# Patient Record
Sex: FEMALE | Race: WHITE | Employment: OTHER | URBAN - METROPOLITAN AREA
[De-identification: names, ages, dates, MRNs, and addresses within clinical notes are randomized per-mention and may not be internally consistent; named-entity substitution may affect disease eponyms.]

---

## 2017-05-24 ENCOUNTER — HOSPITAL ENCOUNTER (OUTPATIENT)
Dept: BONE DENSITY | Age: 67
Discharge: HOME | End: 2017-05-24
Attending: FAMILY MEDICINE
Payer: MEDICARE

## 2017-05-24 DIAGNOSIS — Z78.0 POSTMENOPAUSAL: ICD-10-CM

## 2017-05-24 PROCEDURE — 77080 DXA BONE DENSITY AXIAL: CPT

## 2017-11-28 ENCOUNTER — HOSPITAL ENCOUNTER (OUTPATIENT)
Dept: ULTRASOUND IMAGING | Age: 67
Discharge: HOME | End: 2017-11-28
Attending: FAMILY MEDICINE
Payer: MEDICARE

## 2017-11-28 DIAGNOSIS — E04.1 THYROID NODULE: ICD-10-CM

## 2017-11-28 DIAGNOSIS — R22.1 NECK MASS: ICD-10-CM

## 2017-11-28 PROCEDURE — 76536 US EXAM OF HEAD AND NECK: CPT

## 2018-02-22 ENCOUNTER — HOSPITAL ENCOUNTER (OUTPATIENT)
Dept: NON INVASIVE DIAGNOSTICS | Age: 68
Discharge: HOME | End: 2018-02-22
Attending: FAMILY MEDICINE
Payer: MEDICARE

## 2018-02-22 DIAGNOSIS — R00.2 PALPITATIONS: ICD-10-CM

## 2018-02-22 PROCEDURE — 93225 XTRNL ECG REC<48 HRS REC: CPT

## 2018-03-30 ENCOUNTER — OFFICE VISIT (OUTPATIENT)
Dept: CARDIOLOGY CLINIC | Age: 68
End: 2018-03-30

## 2018-03-30 VITALS
BODY MASS INDEX: 27.21 KG/M2 | WEIGHT: 135 LBS | HEIGHT: 59 IN | DIASTOLIC BLOOD PRESSURE: 65 MMHG | SYSTOLIC BLOOD PRESSURE: 147 MMHG | HEART RATE: 62 BPM

## 2018-03-30 DIAGNOSIS — E78.5 HYPERLIPIDEMIA, UNSPECIFIED HYPERLIPIDEMIA TYPE: ICD-10-CM

## 2018-03-30 DIAGNOSIS — E11.9 TYPE 2 DIABETES MELLITUS WITHOUT COMPLICATION, WITHOUT LONG-TERM CURRENT USE OF INSULIN (HCC): ICD-10-CM

## 2018-03-30 DIAGNOSIS — R00.2 PALPITATION: Primary | ICD-10-CM

## 2018-03-30 DIAGNOSIS — R07.9 CHEST PAIN, UNSPECIFIED TYPE: ICD-10-CM

## 2018-03-30 RX ORDER — ASPIRIN 81 MG/1
TABLET ORAL DAILY
COMMUNITY

## 2018-03-30 RX ORDER — HYDROCHLOROTHIAZIDE 12.5 MG/1
12.5 TABLET ORAL DAILY
COMMUNITY

## 2018-03-30 RX ORDER — BISMUTH SUBSALICYLATE 262 MG
1 TABLET,CHEWABLE ORAL DAILY
COMMUNITY

## 2018-03-30 RX ORDER — GLUCOSAM/CHONDRO/HERB 149/HYAL 750-100 MG
1 TABLET ORAL DAILY
COMMUNITY

## 2018-03-30 RX ORDER — LANOLIN ALCOHOL/MO/W.PET/CERES
1000 CREAM (GRAM) TOPICAL DAILY
COMMUNITY
End: 2022-05-19

## 2018-03-30 RX ORDER — CHROMIUM PICOLINATE 200 MCG
TABLET ORAL
COMMUNITY
End: 2019-08-02

## 2018-03-30 RX ORDER — LISINOPRIL 40 MG/1
40 TABLET ORAL DAILY
COMMUNITY

## 2018-03-30 RX ORDER — CYCLOSPORINE 0.5 MG/ML
1 EMULSION OPHTHALMIC 2 TIMES DAILY
COMMUNITY
End: 2022-05-19

## 2018-03-30 NOTE — MR AVS SNAPSHOT
[address] [facility] 
 
 
 [address] [facility] 
[phone] Patient: Wilson Butler MRN: [id number] PSP:2/2/9002 Visit Information Date & Time Provider Department Dept. Phone Encounter #  
 3/30/2018  8:30 AM Meghann Farr MD [facility] [place] [phone] [id number] Follow-up Instructions Return for F/u after tests. Upcoming Health Maintenance Date Due Hepatitis C Screening 1950 DTaP/Tdap/Td series (1 - Tdap) 1/4/1972 BREAST CANCER SCRN MAMMOGRAM 1/4/2001 FOBT Q 1 YEAR AGE 50-75 1/4/2001 ZOSTER VACCINE AGE 60> 11/4/2010 GLAUCOMA SCREENING Q2Y 1/4/2016 Pneumococcal 65+ Low/Medium Risk (1 of 2 - PCV13) 1/4/2016 Influenza Age 5 to Adult 8/1/2017 MEDICARE YEARLY EXAM 3/20/2018 Allergies as of 3/30/2018  Review Complete On: 3/30/2018 By: Meghann Farr MD  
 No Known Allergies Current Immunizations  Never Reviewed No immunizations on file. Not reviewed this visit You Were Diagnosed With   
  
 Codes Comments Palpitation    -  Primary ICD-10-CM: R00.2 ICD-9-CM: 785.1 frequent pac Chest pain, unspecified type     ICD-10-CM: R07.9 ICD-9-CM: 786.50 ? atypical 
? angina-chest wall-gerd Type 2 diabetes mellitus without complication, without long-term current use of insulin (HCC)     ICD-10-CM: E11.9 ICD-9-CM: 250.00 stable Hyperlipidemia, unspecified hyperlipidemia type     ICD-10-CM: E78.5 ICD-9-CM: 272.4 on statin Vitals BP Pulse Height(growth percentile) Weight(growth percentile) BMI Smoking Status 147/65 62 4' 11\" (1.499 m) 135 lb (61.2 kg) 27.27 kg/m2 Never Smoker Vitals History BMI and BSA Data Body Mass Index Body Surface Area  
 27.27 kg/m 2 1.6 m 2 Your Updated Medication List  
  
   
This list is accurate as of 3/30/18  8:56 AM.  Always use your most recent med list.  
  
  
  
  
 aspirin delayed-release 81 mg tablet Take  by mouth daily. CALCIUM 600 WITH VITAMIN D3 600 mg(1,500mg) -400 unit Cap Generic drug:  Calcium-Cholecalciferol (D3) Take  by mouth. CRESTOR 20 mg tablet Generic drug:  rosuvastatin Take 20 mg by mouth daily. cyanocobalamin 1,000 mcg tablet Take 1,000 mcg by mouth daily. hydroCHLOROthiazide 12.5 mg tablet Commonly known as:  HYDRODIURIL Take 12.5 mg by mouth daily. lisinopril 40 mg tablet Commonly known as:  Tami Best Take 40 mg by mouth daily. metFORMIN  mg tablet Commonly known as:  GLUCOPHAGE XR Take  by mouth two (2) times a day. multivitamin tablet Commonly known as:  ONE A DAY Take 1 Tab by mouth daily. omega 3-DHA-EPA-fish oil 1,000 mg (120 mg-180 mg) capsule Take 1 Cap by mouth daily. PROBIOTIC PO Take  by mouth. RESTASIS 0.05 % ophthalmic emulsion Generic drug:  cycloSPORINE Administer 1 Drop to both eyes two (2) times a day. TOPROL XL 50 mg XL tablet Generic drug:  metoprolol succinate Take  by mouth daily. Follow-up Instructions Return for F/u after tests. To-Do List   
 04/02/2018 Cardiac Services:  2D ECHO COMPLETE ADULT (TTE) 04/06/2018 Nursing:  SCHEDULE NUCLEAR STUDY Patient Instructions Requested EKG from PCP [facility]! Dear Rhiannon Aguero: Thank you for requesting a [employer] account. Our records indicate that you already have an active [employer] account. You can access your account anytime at [email or web address] [email or web address] Did you know that you can access your hospital and ER discharge instructions at any time in [employer]? You can also review all of your test results from your hospital stay or ER visit. Additional Information If you have questions, please visit the Frequently Asked Questions section of the [employer] website at [email or web address] Remember, MyChart is NOT to be used for urgent needs. For medical emergencies, dial 911. Now available from your iPhone and Android! Please provide this summary of care documentation to your next provider. Your primary care clinician is listed as Bebeto Healy. If you have any questions after today's visit, please call [phone].

## 2018-03-30 NOTE — LETTER
Racheal Nieto 
1950 
 
3/30/2018 Dear MD Richelle Dejesus MD Yates Rayas, MD 
 
I had the pleasure of evaluating  Ms. Nieto in office today. Below are the relevant portions of my assessment and plan of care. ICD-10-CM ICD-9-CM 1. Palpitation R00.2 785.1 SCHEDULE NUCLEAR STUDY  
   2D ECHO COMPLETE ADULT (TTE) frequent pac 2. Chest pain, unspecified type R07.9 786.50 SCHEDULE NUCLEAR STUDY  
   2D ECHO COMPLETE ADULT (TTE) ? atypical 
? angina-chest wall-gerd 3. Type 2 diabetes mellitus without complication, without long-term current use of insulin (HCC) E11.9 250.00   
 stable 4. Hyperlipidemia, unspecified hyperlipidemia type E78.5 272.4   
 on statin Current Outpatient Prescriptions Medication Sig Dispense Refill  lisinopril (PRINIVIL, ZESTRIL) 40 mg tablet Take 40 mg by mouth daily.  hydroCHLOROthiazide (HYDRODIURIL) 12.5 mg tablet Take 12.5 mg by mouth daily.  cycloSPORINE (RESTASIS) 0.05 % ophthalmic emulsion Administer 1 Drop to both eyes two (2) times a day.  multivitamin (ONE A DAY) tablet Take 1 Tab by mouth daily.  aspirin delayed-release 81 mg tablet Take  by mouth daily.  LACTOBACILLUS ACIDOPHILUS (PROBIOTIC PO) Take  by mouth.  cyanocobalamin 1,000 mcg tablet Take 1,000 mcg by mouth daily.  Calcium-Cholecalciferol, D3, (CALCIUM 600 WITH VITAMIN D3) 600 mg(1,500mg) -400 unit cap Take  by mouth.  omega 3-DHA-EPA-fish oil 1,000 mg (120 mg-180 mg) capsule Take 1 Cap by mouth daily.  metoprolol-XL (TOPROL XL) 50 mg XL tablet Take  by mouth daily.  rosuvastatin (CRESTOR) 20 mg tablet Take 20 mg by mouth daily.  metFORMIN ER (GLUCOPHAGE XR) 500 mg tablet Take  by mouth two (2) times a day. Orders Placed This Encounter  SCHEDULE NUCLEAR STUDY Exercise stress test  
  Standing Status:   Future Standing Expiration Date:   3/30/2019  2D ECHO COMPLETE ADULT (TTE) Standing Status:   Future Standing Expiration Date:   9/26/2018 Order Specific Question:   Reason for Exam: Answer:   see diagnosis  lisinopril (PRINIVIL, ZESTRIL) 40 mg tablet Sig: Take 40 mg by mouth daily.  hydroCHLOROthiazide (HYDRODIURIL) 12.5 mg tablet Sig: Take 12.5 mg by mouth daily.  cycloSPORINE (RESTASIS) 0.05 % ophthalmic emulsion Sig: Administer 1 Drop to both eyes two (2) times a day.  multivitamin (ONE A DAY) tablet Sig: Take 1 Tab by mouth daily.  aspirin delayed-release 81 mg tablet Sig: Take  by mouth daily.  LACTOBACILLUS ACIDOPHILUS (PROBIOTIC PO) Sig: Take  by mouth.  cyanocobalamin 1,000 mcg tablet Sig: Take 1,000 mcg by mouth daily.  Calcium-Cholecalciferol, D3, (CALCIUM 600 WITH VITAMIN D3) 600 mg(1,500mg) -400 unit cap Sig: Take  by mouth.  omega 3-DHA-EPA-fish oil 1,000 mg (120 mg-180 mg) capsule Sig: Take 1 Cap by mouth daily. If you have questions, please do not hesitate to call me. I look forward to following Ms. Nieto along with you. Sincerely, Benigno Dolan MD

## 2018-03-30 NOTE — PROGRESS NOTES
HISTORY OF PRESENT ILLNESS  Anika Nieto is a 80 y.o. female. New Patient   The history is provided by the patient. Associated symptoms include chest pain. Pertinent negatives include no abdominal pain, no headaches and no shortness of breath. Palpitations    The history is provided by the patient. This is a new problem. The problem has been gradually improving. The problem occurs every several days. The problem is associated with nothing. Associated symptoms include chest pain. Pertinent negatives include no fever, no claudication, no near-syncope, no orthopnea, no PND, no abdominal pain, no nausea, no vomiting, no headaches, no dizziness, no weakness, no cough, no hemoptysis, no shortness of breath and no sputum production. Chest Pain    The history is provided by the patient. This is a new problem. The problem has been resolved. The problem occurs rarely. The pain is associated with rest. The pain is present in the substernal region. The pain is mild. The quality of the pain is described as pressure-like. The pain does not radiate. Associated symptoms include palpitations. Pertinent negatives include no abdominal pain, no claudication, no cough, no dizziness, no fever, no headaches, no hemoptysis, no nausea, no near-syncope, no orthopnea, no PND, no shortness of breath, no sputum production, no vomiting and no weakness. Review of Systems   Constitutional: Negative for chills and fever. HENT: Negative for nosebleeds. Eyes: Negative for blurred vision and double vision. Respiratory: Negative for cough, hemoptysis, sputum production, shortness of breath and wheezing. Cardiovascular: Positive for chest pain and palpitations. Negative for orthopnea, claudication, leg swelling, PND and near-syncope. Gastrointestinal: Negative for abdominal pain, heartburn, nausea and vomiting. Musculoskeletal: Negative for myalgias. Skin: Negative for rash.    Neurological: Negative for dizziness, weakness and headaches. Endo/Heme/Allergies: Does not bruise/bleed easily. Family History   Problem Relation Age of Onset    Heart Attack Mother     Stroke Mother     Heart Attack Maternal Aunt     Stroke Maternal Aunt     Heart Attack Maternal Uncle     Stroke Maternal Uncle     Heart Attack Maternal Grandmother     Stroke Maternal Grandmother        Past Medical History:   Diagnosis Date    Chest pain 3/30/2018    ? atypical ? angina-chest wall-gerd     Diabetes mellitus     Essential hypertension     Hyperlipidemia 3/30/2018    on statin    Palpitation 3/30/2018    frequent pac     Type 2 diabetes mellitus without complication, without long-term current use of insulin ([facility] [address] 3/30/2018    stable       Past Surgical History:   Procedure Laterality Date    HX HEENT  1950's    eye surgery on both eyes  crossed eye    HX HEENT      eye lid surgery       Social History   Substance Use Topics    Smoking status: Never Smoker    Smokeless tobacco: Never Used    Alcohol use No       No Known Allergies    Prior to Admission medications    Medication Sig Start Date End Date Taking? Authorizing Provider   lisinopril (PRINIVIL, ZESTRIL) 40 mg tablet Take 40 mg by mouth daily. Yes Historical Provider   hydroCHLOROthiazide (HYDRODIURIL) 12.5 mg tablet Take 12.5 mg by mouth daily. Yes Historical Provider   cycloSPORINE (RESTASIS) 0.05 % ophthalmic emulsion Administer 1 Drop to both eyes two (2) times a day. Yes Historical Provider   multivitamin (ONE A DAY) tablet Take 1 Tab by mouth daily. Yes Historical Provider   aspirin delayed-release 81 mg tablet Take  by mouth daily. Yes Historical Provider   LACTOBACILLUS ACIDOPHILUS (PROBIOTIC PO) Take  by mouth. Yes Historical Provider   cyanocobalamin 1,000 mcg tablet Take 1,000 mcg by mouth daily. Yes Historical Provider   Calcium-Cholecalciferol, D3, (CALCIUM 600 WITH VITAMIN D3) 600 mg(1,500mg) -400 unit cap Take  by mouth.    Yes Historical Provider omega 3-DHA-EPA-fish oil 1,000 mg (120 mg-180 mg) capsule Take 1 Cap by mouth daily. Yes Historical Provider   metoprolol-XL (TOPROL XL) 50 mg XL tablet Take  by mouth daily. Yes Historical Provider   rosuvastatin (CRESTOR) 20 mg tablet Take 20 mg by mouth daily. Yes Historical Provider   metFORMIN ER (GLUCOPHAGE XR) 500 mg tablet Take  by mouth two (2) times a day. Yes Historical Provider         Visit Vitals    /65    Pulse 62    Ht 4' 11\" (1.499 m)    Wt 61.2 kg (135 lb)    BMI 27.27 kg/m2         Physical Exam   Constitutional: She is oriented to person, place, and time. She appears well-developed and well-nourished. HENT:   Head: Normocephalic and atraumatic. Eyes: Conjunctivae are normal.   Neck: Neck supple. No JVD present. No tracheal deviation present. No thyromegaly present. Cardiovascular: Normal rate and regular rhythm. PMI is not displaced. Exam reveals no gallop, no S3 and no decreased pulses. No murmur heard. Pulmonary/Chest: No respiratory distress. She has no wheezes. She has no rales. She exhibits no tenderness. Abdominal: Soft. There is no tenderness. Musculoskeletal: She exhibits no edema. Neurological: She is alert and oriented to person, place, and time. Skin: Skin is warm. Psychiatric: She has a normal mood and affect. Ms. Nieto has a reminder for a \"due or due soon\" health maintenance. I have asked that she contact her primary care provider for follow-up on this health maintenance. I Have personally reviewed recent relevant labs available and discussed with patient  tsh-lipids    holter-2/2018  Rhythm is sinus. 2. CO and QRS are within normal limits. 3. 54 single VEs. 4. 4497 single SVEs, 40 paired, 17 runs of SVT (maximum 5 beats in duration). 5. Patient diary was submitted with 2 episodes of palpitations. Assessment         ICD-10-CM ICD-9-CM    1.  Palpitation R00.2 785.1 SCHEDULE NUCLEAR STUDY      2D ECHO COMPLETE ADULT (TTE)    frequent pac     2. Chest pain, unspecified type R07.9 786.50 SCHEDULE NUCLEAR STUDY      2D ECHO COMPLETE ADULT (TTE)    ? atypical  ? angina-chest wall-gerd     3. Type 2 diabetes mellitus without complication, without long-term current use of insulin (HCC) E11.9 250.00     stable   4. Hyperlipidemia, unspecified hyperlipidemia type E78.5 272.4     on statin       Medications Discontinued During This Encounter   Medication Reason    fenofibric acid (TRILIPIX) 135 mg capsule Not A Current Medication    latanoprost (XALATAN) 0.005 % ophthalmic solution Not A Current Medication    lisinopril-hydrochlorothiazide (PRINZIDE, ZESTORETIC) 20-25 mg per tablet Not A Current Medication       Orders Placed This Encounter    SCHEDULE NUCLEAR STUDY     Exercise stress test     Standing Status:   Future     Standing Expiration Date:   3/30/2019    2D ECHO COMPLETE ADULT (TTE)     Standing Status:   Future     Standing Expiration Date:   9/26/2018     Order Specific Question:   Reason for Exam:     Answer:   see diagnosis       Follow-up Disposition:  Return for F/u after tests.

## 2018-04-09 ENCOUNTER — CLINICAL SUPPORT (OUTPATIENT)
Dept: CARDIOLOGY CLINIC | Age: 68
End: 2018-04-09

## 2018-04-09 DIAGNOSIS — R00.2 PALPITATION: ICD-10-CM

## 2018-04-09 DIAGNOSIS — R07.9 CHEST PAIN, UNSPECIFIED TYPE: ICD-10-CM

## 2018-04-09 DIAGNOSIS — R07.9 CHEST PAIN, UNSPECIFIED TYPE: Primary | ICD-10-CM

## 2018-04-09 DIAGNOSIS — E78.5 HYPERLIPIDEMIA, UNSPECIFIED HYPERLIPIDEMIA TYPE: ICD-10-CM

## 2018-04-09 LAB — LDL-C, EXTERNAL: 28

## 2018-04-09 NOTE — PROGRESS NOTES
[facility]  [address], [address] [place] [address], [place], [address] [place] [address]  [phone] [place] [phone]      Name: Maegan Nieto         MRN#: [id number]      YOB: 1950     Gender: female Ht:4'11 \" Wt:135 lbs            Date of Rest/Stress Images: 4/9/2018   Referring Provider: Cherry Grewal MD  Ordering Provider: Nicci Rodrigez. Sara John MD, [facility]  Technologist: Burnard Holstein. Sandor JUNG, C.N.M.T. Diagnosis:   1. Chest pain, unspecified type    2. Palpitation    3. Hyperlipidemia, unspecified hyperlipidemia type          Rest/Stress Myoview SPECT Myocardial Perfusion Imaging with  Exercise Stress and Gated SPECT Imaging      PROCEDURE:      Myocardial perfusion imaging was performed at rest approximately 30 mins following the intravenous injection,(Right hand ) of 12.2 mCi of Tc99m Myoview for evaluation of myocardial function and perfusion at rest.     Baseline Data:  Baseline EKG reveals normal sinus rhythm with occasional PVCs. Baseline heart rate was 54 beats per minute. Baseline blood pressure was 170/86 mmHg. Procedure: Following this, the patient exercised on the treadmill using standard [place] protocol. The patient walked for a total of 8 minutes and 7 seconds, achieved a work load of 10.10 METS. Maximum heart rate was 126 beats per minute, which represents 82% of the maximal age-predicted heart rate. Maximum blood pressure was 190/102 mmHg. The patient did not report chest pain or trouble breathing. No ventricular arrhythmias were noted. No ischemic ST-T changes were noted. The test was stopped due to completion of protocol. Diagnosis:  1. Negative EKG portion for ischemia. 2. Nuclear imaging report to follow. Thank you for this referral.             Exercise:   At peak exercise, the patient was injected intravenously with 34.9 mCi of Tc99m Myoview and exercise was continued for 15 to 45 seconds. The stress images were obtained approximately 30 minutes post tracer injection. The stress SPECT study was gated to evaluate regional wall motion and calculate the left ventricular ejection fraction. The data was reconstructed in the short, horizontal long and vertical long axis views and tomographic slices were generated. NUCLEAR IMAGING:     Findings:   1. Stress images reveal normal Myoview distrubution in all the LV segments in short axis, vertical and horizontal long axis views. 2. Resting images have a normal uptake. 3. Gated images reveal normal wall motion and the ejection fraction is calculated to be 78%. Conclusion:   1. Normal perfusion scan. 2. Normal wall motion and ejection fraction. 3. Low risk scan.     Thank you for the referral.    E-signed and Interpreting Physician:    Heather Severino MD, [facility]     Date of interpretation: 4/9/2018  Date of final report: 4/9/2018

## 2018-04-17 ENCOUNTER — OFFICE VISIT (OUTPATIENT)
Dept: CARDIOLOGY CLINIC | Age: 68
End: 2018-04-17

## 2018-04-17 VITALS
SYSTOLIC BLOOD PRESSURE: 125 MMHG | DIASTOLIC BLOOD PRESSURE: 54 MMHG | HEART RATE: 55 BPM | HEIGHT: 59 IN | BODY MASS INDEX: 27.62 KG/M2 | WEIGHT: 137 LBS

## 2018-04-17 DIAGNOSIS — I34.0 NON-RHEUMATIC MITRAL REGURGITATION: ICD-10-CM

## 2018-04-17 DIAGNOSIS — R00.2 PALPITATION: ICD-10-CM

## 2018-04-17 DIAGNOSIS — R07.9 CHEST PAIN, UNSPECIFIED TYPE: Primary | ICD-10-CM

## 2018-04-17 RX ORDER — DOXAZOSIN 2 MG/1
2 TABLET ORAL DAILY
COMMUNITY
End: 2020-11-02

## 2018-04-17 NOTE — PROGRESS NOTES
1. Have you been to the ER, urgent care clinic since your last visit? Hospitalized since your last visit?     no  2. Have you seen or consulted any other health care providers outside of the [address] [place] [address] since your last visit? Include any pap smears or colon screening. Yes Where: pcp     3. Since your last visit, have you had any of the following symptoms?  no

## 2018-04-17 NOTE — MR AVS SNAPSHOT
303 [facility] 
 
 
 [address] 200 [facility] 
[phone] Patient: Karlene Lewis MRN: [id number] EOL:9/3/0233 Visit Information Date & Time Provider Department Dept. Phone Encounter #  
 4/17/2018  8:30 AM Anamaria Ortiz MD [facility] [place] [phone] [id number] Follow-up Instructions Return in about 1 year (around 4/17/2019). Upcoming Health Maintenance Date Due Hepatitis C Screening 1950 HEMOGLOBIN A1C Q6M 1950 LIPID PANEL Q1 1950 FOOT EXAM Q1 1/4/1961 MICROALBUMIN Q1 1/4/1961 EYE EXAM RETINAL OR DILATED Q1 1/4/1961 DTaP/Tdap/Td series (1 - Tdap) 1/4/1972 BREAST CANCER SCRN MAMMOGRAM 1/4/2001 FOBT Q 1 YEAR AGE 50-75 1/4/2001 ZOSTER VACCINE AGE 60> 11/4/2010 GLAUCOMA SCREENING Q2Y 1/4/2016 Pneumococcal 65+ Low/Medium Risk (1 of 2 - PCV13) 1/4/2016 Influenza Age 5 to Adult 8/1/2017 MEDICARE YEARLY EXAM 3/20/2018 Allergies as of 4/17/2018  Review Complete On: 4/17/2018 By: Anamaria Ortiz MD  
 No Known Allergies Current Immunizations  Never Reviewed No immunizations on file. Not reviewed this visit You Were Diagnosed With   
  
 Codes Comments Chest pain, unspecified type    -  Primary ICD-10-CM: R07.9 ICD-9-CM: 786.50 negative stres stest  
 Palpitation     ICD-10-CM: R00.2 ICD-9-CM: 785.1 pac 
improving off caffein monitor Non-rheumatic mitral regurgitation     ICD-10-CM: I34.0 ICD-9-CM: 424.0 mild asymptomatic 
monitor Vitals BP Pulse Height(growth percentile) Weight(growth percentile) BMI Smoking Status 125/54 (!) 55 4' 11\" (1.499 m) 137 lb (62.1 kg) 27.67 kg/m2 Never Smoker Vitals History BMI and BSA Data Body Mass Index Body Surface Area  
 27.67 kg/m 2 1.61 m 2 Your Updated Medication List  
  
   
This list is accurate as of 4/17/18  8:47 AM.  Always use your most recent med list.  
  
  
  
  
 aspirin delayed-release 81 mg tablet Take  by mouth daily. CALCIUM 600 WITH VITAMIN D3 600 mg(1,500mg) -400 unit Cap Generic drug:  Calcium-Cholecalciferol (D3) Take  by mouth. CRESTOR 20 mg tablet Generic drug:  rosuvastatin Take 20 mg by mouth daily. cyanocobalamin 1,000 mcg tablet Take 1,000 mcg by mouth daily. doxazosin 2 mg tablet Commonly known as:  CARDURA Take 2 mg by mouth daily. hydroCHLOROthiazide 12.5 mg tablet Commonly known as:  HYDRODIURIL Take 12.5 mg by mouth daily. lisinopril 40 mg tablet Commonly known as:  Fanny [place] Take 40 mg by mouth daily. metFORMIN  mg tablet Commonly known as:  GLUCOPHAGE XR Take  by mouth two (2) times a day. multivitamin tablet Commonly known as:  ONE A DAY Take 1 Tab by mouth daily. omega 3-DHA-EPA-fish oil 1,000 mg (120 mg-180 mg) capsule Take 1 Cap by mouth daily. PROBIOTIC PO Take  by mouth. RESTASIS 0.05 % ophthalmic emulsion Generic drug:  cycloSPORINE Administer 1 Drop to both eyes two (2) times a day. TOPROL XL 50 mg XL tablet Generic drug:  metoprolol succinate Take  by mouth daily. Follow-up Instructions Return in about 1 year (around 4/17/2019). Introducing [facility]! Dear Nadine Pimentel: Thank you for requesting a [employer] account. Our records indicate that you already have an active [employer] account. You can access your account anytime at [email or web address]/[email or web address] Did you know that you can access your hospital and ER discharge instructions at any time in [employer]? You can also review all of your test results from your hospital stay or ER visit. Additional Information If you have questions, please visit the Frequently Asked Questions section of the [employer] website at [email or web address]/. Remember, [employer] is NOT to be used for urgent needs.  For medical emergencies, dial 911. Now available from your iPhone and Android! Please provide this summary of care documentation to your next provider. Your primary care clinician is listed as Emma Jolly. If you have any questions after today's visit, please call [phone].

## 2018-04-17 NOTE — LETTER
Zee Nieto 
1950 4/17/2018 Dear MD Leora Nayak MD Brenna Giovanni, MD 
 
I had the pleasure of evaluating  Ms. Nieto in office today. Below are the relevant portions of my assessment and plan of care. ICD-10-CM ICD-9-CM 1. Chest pain, unspecified type R07.9 786.50   
 negative stres stest  
2. Palpitation R00.2 785.1 pac 
improving off caffein monitor 3. Non-rheumatic mitral regurgitation I34.0 424.0   
 mild asymptomatic 
monitor Current Outpatient Prescriptions Medication Sig Dispense Refill  doxazosin (CARDURA) 2 mg tablet Take 2 mg by mouth daily.  lisinopril (PRINIVIL, ZESTRIL) 40 mg tablet Take 40 mg by mouth daily.  hydroCHLOROthiazide (HYDRODIURIL) 12.5 mg tablet Take 12.5 mg by mouth daily.  cycloSPORINE (RESTASIS) 0.05 % ophthalmic emulsion Administer 1 Drop to both eyes two (2) times a day.  multivitamin (ONE A DAY) tablet Take 1 Tab by mouth daily.  aspirin delayed-release 81 mg tablet Take  by mouth daily.  LACTOBACILLUS ACIDOPHILUS (PROBIOTIC PO) Take  by mouth.  cyanocobalamin 1,000 mcg tablet Take 1,000 mcg by mouth daily.  Calcium-Cholecalciferol, D3, (CALCIUM 600 WITH VITAMIN D3) 600 mg(1,500mg) -400 unit cap Take  by mouth.  omega 3-DHA-EPA-fish oil 1,000 mg (120 mg-180 mg) capsule Take 1 Cap by mouth daily.  metoprolol-XL (TOPROL XL) 50 mg XL tablet Take  by mouth daily.  rosuvastatin (CRESTOR) 20 mg tablet Take 20 mg by mouth daily.  metFORMIN ER (GLUCOPHAGE XR) 500 mg tablet Take  by mouth two (2) times a day. Orders Placed This Encounter  doxazosin (CARDURA) 2 mg tablet Sig: Take 2 mg by mouth daily. If you have questions, please do not hesitate to call me. I look forward to following Ms. Nieto along with you. Sincerely, Mike Holley MD

## 2018-04-17 NOTE — PROGRESS NOTES
HISTORY OF PRESENT ILLNESS  Asuncion Nieto is a 80 y.o. female. Hypertension   The history is provided by the patient. This is a chronic problem. The problem occurs constantly. Pertinent negatives include no chest pain, no abdominal pain, no headaches and no shortness of breath. Palpitations    The history is provided by the patient. This is a new problem. The problem has been gradually improving. The problem occurs every several days. The problem is associated with nothing. Pertinent negatives include no fever, no chest pain, no claudication, no near-syncope, no orthopnea, no PND, no abdominal pain, no nausea, no vomiting, no headaches, no dizziness, no weakness, no cough, no hemoptysis, no shortness of breath and no sputum production. Her past medical history is significant for hypertension. Chest Pain (Angina)    The history is provided by the patient. This is a new problem. The problem has been resolved. The problem occurs rarely. The pain is associated with rest. The pain is present in the substernal region. The pain is mild. The quality of the pain is described as pressure-like. The pain does not radiate. Associated symptoms include palpitations. Pertinent negatives include no abdominal pain, no claudication, no cough, no dizziness, no fever, no headaches, no hemoptysis, no nausea, no near-syncope, no orthopnea, no PND, no shortness of breath, no sputum production, no vomiting and no weakness. Review of Systems   Constitutional: Negative for chills and fever. HENT: Negative for nosebleeds. Eyes: Negative for blurred vision and double vision. Respiratory: Negative for cough, hemoptysis, sputum production, shortness of breath and wheezing. Cardiovascular: Positive for palpitations. Negative for chest pain, orthopnea, claudication, leg swelling, PND and near-syncope. Gastrointestinal: Negative for abdominal pain, heartburn, nausea and vomiting. Musculoskeletal: Negative for myalgias. Skin: Negative for rash. Neurological: Negative for dizziness, weakness and headaches. Endo/Heme/Allergies: Does not bruise/bleed easily. Family History   Problem Relation Age of Onset    Heart Attack Mother     Stroke Mother     Heart Attack Maternal Aunt     Stroke Maternal Aunt     Heart Attack Maternal Uncle     Stroke Maternal Uncle     Heart Attack Maternal Grandmother     Stroke Maternal Grandmother        Past Medical History:   Diagnosis Date    Chest pain 3/30/2018    ? atypical ? angina-chest wall-gerd     Diabetes mellitus     Essential hypertension     Hyperlipidemia 3/30/2018    on statin    Palpitation 3/30/2018    frequent pac     Type 2 diabetes mellitus without complication, without long-term current use of insulin ([facility] [address] 3/30/2018    stable       Past Surgical History:   Procedure Laterality Date    HX HEENT  1950's    eye surgery on both eyes  crossed eye    HX HEENT      eye lid surgery       Social History   Substance Use Topics    Smoking status: Never Smoker    Smokeless tobacco: Never Used    Alcohol use No       No Known Allergies    Prior to Admission medications    Medication Sig Start Date End Date Taking? Authorizing Provider   doxazosin (CARDURA) 2 mg tablet Take 2 mg by mouth daily. Yes Historical Provider   lisinopril (PRINIVIL, ZESTRIL) 40 mg tablet Take 40 mg by mouth daily. Yes Historical Provider   hydroCHLOROthiazide (HYDRODIURIL) 12.5 mg tablet Take 12.5 mg by mouth daily. Yes Historical Provider   cycloSPORINE (RESTASIS) 0.05 % ophthalmic emulsion Administer 1 Drop to both eyes two (2) times a day. Yes Historical Provider   multivitamin (ONE A DAY) tablet Take 1 Tab by mouth daily. Yes Historical Provider   aspirin delayed-release 81 mg tablet Take  by mouth daily. Yes Historical Provider   LACTOBACILLUS ACIDOPHILUS (PROBIOTIC PO) Take  by mouth. Yes Historical Provider   cyanocobalamin 1,000 mcg tablet Take 1,000 mcg by mouth daily. Yes Historical Provider   Calcium-Cholecalciferol, D3, (CALCIUM 600 WITH VITAMIN D3) 600 mg(1,500mg) -400 unit cap Take  by mouth. Yes Historical Provider   omega 3-DHA-EPA-fish oil 1,000 mg (120 mg-180 mg) capsule Take 1 Cap by mouth daily. Yes Historical Provider   metoprolol-XL (TOPROL XL) 50 mg XL tablet Take  by mouth daily. Yes Historical Provider   rosuvastatin (CRESTOR) 20 mg tablet Take 20 mg by mouth daily. Yes Historical Provider   metFORMIN ER (GLUCOPHAGE XR) 500 mg tablet Take  by mouth two (2) times a day. Yes Historical Provider         Visit Vitals    /54    Pulse (!) 55    Ht 4' 11\" (1.499 m)    Wt 62.1 kg (137 lb)    BMI 27.67 kg/m2         Physical Exam   Constitutional: She is oriented to person, place, and time. She appears well-developed and well-nourished. HENT:   Head: Normocephalic and atraumatic. Eyes: Conjunctivae are normal.   Neck: Neck supple. No JVD present. No tracheal deviation present. No thyromegaly present. Cardiovascular: Normal rate and regular rhythm. PMI is not displaced. Exam reveals no gallop, no S3 and no decreased pulses. No murmur heard. Pulmonary/Chest: No respiratory distress. She has no wheezes. She has no rales. She exhibits no tenderness. Abdominal: Soft. There is no tenderness. Musculoskeletal: She exhibits no edema. Neurological: She is alert and oriented to person, place, and time. Skin: Skin is warm. Psychiatric: She has a normal mood and affect. Ms. Nieto has a reminder for a \"due or due soon\" health maintenance. I have asked that she contact her primary care provider for follow-up on this health maintenance. I Have personally reviewed recent relevant labs available and discussed with patient  tsh-lipids    holter-2/2018  Rhythm is sinus. 2. [place] and QRS are within normal limits. 3. 54 single VEs. 4. 4497 single SVEs, 40 paired, 17 runs of SVT (maximum 5 beats in duration).   5. Patient diary was submitted with 2 episodes of palpitations. SUMMARY:stress test-2018  Left ventricle: Systolic function was normal. Ejection fraction was  estimated in the range of 55 % to 60 %. There were no regional wall motion  abnormalities. Mitral valve: There was mild regurgitation. Tricuspid valve: There was mild regurgitation. NUCLEAR IMAGIN      Findings:   1. Stress images reveal normal Myoview distrubution in all the LV segments in short axis, vertical and horizontal long axis views. 2. Resting images have a normal uptake. 3. Gated images reveal normal wall motion and the ejection fraction is calculated to be 78%. Conclusion:   1. Normal perfusion scan. 2. Normal wall motion and ejection fraction. 3. Low risk scan. Assessment         ICD-10-CM ICD-9-CM    1. Chest pain, unspecified type R07.9 786.50     negative stres stest   2. Palpitation R00.2 785.1     pac  improving off caffein monitor   3. Non-rheumatic mitral regurgitation I34.0 424.0     mild asymptomatic  monitor       There are no discontinued medications. No orders of the defined types were placed in this encounter. Follow-up Disposition:  Return in about 1 year (around 2019).

## 2018-09-10 ENCOUNTER — HOSPITAL ENCOUNTER (OUTPATIENT)
Dept: MAMMOGRAPHY | Age: 68
Discharge: HOME | End: 2018-09-10
Attending: FAMILY MEDICINE
Payer: MEDICARE

## 2018-09-10 DIAGNOSIS — Z12.31 VISIT FOR SCREENING MAMMOGRAM: ICD-10-CM

## 2018-09-10 PROCEDURE — 77063 BREAST TOMOSYNTHESIS BI: CPT

## 2019-05-14 LAB — LDL-C, EXTERNAL: 28

## 2019-05-20 ENCOUNTER — OFFICE VISIT (OUTPATIENT)
Dept: CARDIOLOGY CLINIC | Age: 69
End: 2019-05-20

## 2019-05-20 VITALS
SYSTOLIC BLOOD PRESSURE: 128 MMHG | HEART RATE: 55 BPM | BODY MASS INDEX: 28.02 KG/M2 | WEIGHT: 139 LBS | HEIGHT: 59 IN | DIASTOLIC BLOOD PRESSURE: 55 MMHG

## 2019-05-20 DIAGNOSIS — R00.2 PALPITATIONS: ICD-10-CM

## 2019-05-20 DIAGNOSIS — E78.5 HYPERLIPIDEMIA, UNSPECIFIED HYPERLIPIDEMIA TYPE: ICD-10-CM

## 2019-05-20 DIAGNOSIS — I34.0 NON-RHEUMATIC MITRAL REGURGITATION: Primary | ICD-10-CM

## 2019-05-20 DIAGNOSIS — R42 DIZZINESS: ICD-10-CM

## 2019-05-20 NOTE — PROGRESS NOTES
1. Have you been to the ER, urgent care clinic since your last visit? Hospitalized since your last visit? No     2. Have you seen or consulted any other health care providers outside of the [address] [place] [address] since your last visit? Include any pap smears or colon screening. Yes Where: PCP/Podiatrist     3. Since your last visit, have you had any of the following symptoms? .           4. Have you had any blood work, X-rays or cardiac testing? 5. Where do you normally have your labs drawn? 6. Do you need any refills today?

## 2019-05-20 NOTE — PROGRESS NOTES
HISTORY OF PRESENT ILLNESS  Yuli Nieto is a 77 y.o. female. 5/2019  Occasional episode of palpitation. Also feels lightheaded with near passing out when she is working in garden in the daytime. Happens on and off for past few years. No complete syncope. Hypertension   The history is provided by the patient. This is a chronic problem. The problem occurs constantly. Palpitations    The history is provided by the patient. This is a new problem. The problem has been gradually improving. The problem occurs every several days. The problem is associated with nothing. Her past medical history is significant for hypertension. Review of Systems   Constitutional: Negative for chills. HENT: Negative for nosebleeds. Eyes: Negative for blurred vision and double vision. Respiratory: Negative for wheezing. Cardiovascular: Negative for leg swelling. Gastrointestinal: Negative for heartburn. Musculoskeletal: Negative for myalgias. Skin: Negative for rash. Endo/Heme/Allergies: Does not bruise/bleed easily. Family History   Problem Relation Age of Onset    Heart Attack Mother     Stroke Mother     Heart Attack Maternal Aunt     Stroke Maternal Aunt     Heart Attack Maternal Uncle     Stroke Maternal Uncle     Heart Attack Maternal Grandmother     Stroke Maternal Grandmother        Past Medical History:   Diagnosis Date    Chest pain 3/30/2018    ?  atypical ? angina-chest wall-gerd     Diabetes mellitus     Essential hypertension     Hyperlipidemia 3/30/2018    on statin    Palpitation 3/30/2018    frequent pac     Type 2 diabetes mellitus without complication, without long-term current use of insulin ([address] 3/30/2018    stable       Past Surgical History:   Procedure Laterality Date    HX HEENT  1950's    eye surgery on both eyes  crossed eye    HX HEENT      eye lid surgery       Social History     Tobacco Use    Smoking status: Never Smoker    Smokeless tobacco: Never Used Substance Use Topics    Alcohol use: No       No Known Allergies    Prior to Admission medications    Medication Sig Start Date End Date Taking? Authorizing Provider   doxazosin (CARDURA) 2 mg tablet Take 2 mg by mouth daily. Yes Provider, Historical   lisinopril (PRINIVIL, ZESTRIL) 40 mg tablet Take 40 mg by mouth daily. Yes Provider, Historical   hydroCHLOROthiazide (HYDRODIURIL) 12.5 mg tablet Take 12.5 mg by mouth daily. Yes Provider, Historical   cycloSPORINE (RESTASIS) 0.05 % ophthalmic emulsion Administer 1 Drop to both eyes two (2) times a day. Yes Provider, Historical   multivitamin (ONE A DAY) tablet Take 1 Tab by mouth daily. Yes Provider, Historical   aspirin delayed-release 81 mg tablet Take  by mouth daily. Yes Provider, Historical   LACTOBACILLUS ACIDOPHILUS (PROBIOTIC PO) Take  by mouth. Yes Provider, Historical   cyanocobalamin 1,000 mcg tablet Take 1,000 mcg by mouth daily. Yes Provider, Historical   Calcium-Cholecalciferol, D3, (CALCIUM 600 WITH VITAMIN D3) 600 mg(1,500mg) -400 unit cap Take  by mouth. Yes Provider, Historical   omega 3-DHA-EPA-fish oil 1,000 mg (120 mg-180 mg) capsule Take 1 Cap by mouth daily. Yes Provider, Historical   metoprolol-XL (TOPROL XL) 50 mg XL tablet Take  by mouth daily. Yes Provider, Historical   rosuvastatin (CRESTOR) 20 mg tablet Take 20 mg by mouth daily. Yes Provider, Historical   metFORMIN ER (GLUCOPHAGE XR) 500 mg tablet Take  by mouth two (2) times a day. Yes Provider, Historical         Visit Vitals  /55   Pulse (!) 55   Ht 4' 11\" (1.499 m)   Wt 63 kg (139 lb)   BMI 28.07 kg/m²         Physical Exam   Constitutional: She is oriented to person, place, and time. She appears well-developed and well-nourished. HENT:   Head: Normocephalic and atraumatic. Eyes: Conjunctivae are normal.   Neck: Neck supple. No JVD present. No tracheal deviation present. No thyromegaly present.    Cardiovascular: Normal rate and regular rhythm. PMI is not displaced. Exam reveals no gallop, no S3 and no decreased pulses. No murmur heard. Pulmonary/Chest: No respiratory distress. She has no wheezes. She has no rales. She exhibits no tenderness. Abdominal: Soft. There is no tenderness. Musculoskeletal: She exhibits no edema. Neurological: She is alert and oriented to person, place, and time. Skin: Skin is warm. Psychiatric: She has a normal mood and affect. Ms. Nieto has a reminder for a \"due or due soon\" health maintenance. I have asked that she contact her primary care provider for follow-up on this health maintenance. I Have personally reviewed recent relevant labs available and discussed with patient  tsh-lipids    holter-2018  Rhythm is sinus. 2. [place] and QRS are within normal limits. 3. 54 single VEs. 4. 4497 single SVEs, 40 paired, 17 runs of SVT (maximum 5 beats in duration). 5. Patient diary was submitted with 2 episodes of palpitations. SUMMARY:stress test-2018  Left ventricle: Systolic function was normal. Ejection fraction was  estimated in the range of 55 % to 60 %. There were no regional wall motion  abnormalities. Mitral valve: There was mild regurgitation. Tricuspid valve: There was mild regurgitation. NUCLEAR IMAGIN      Findings:   1. Stress images reveal normal Myoview distrubution in all the LV segments in short axis, vertical and horizontal long axis views. 2. Resting images have a normal uptake. 3. Gated images reveal normal wall motion and the ejection fraction is calculated to be 78%. Conclusion:   1. Normal perfusion scan. 2. Normal wall motion and ejection fraction. 3. Low risk scan. Assessment         ICD-10-CM ICD-9-CM    1. Non-rheumatic mitral regurgitation I34.0 424.0     Stable mild asymptomatic   2. Hyperlipidemia, unspecified hyperlipidemia type E78.5 272.4     Continue treatment we will get labs from PCP   3. Palpitations R00.2 785.1     Occasional episode. Continue current treatment with beta-blocker   4. Dizziness R42 780.4     Associated with exertion in the gardening. Possible orthostatic or vagal.  Continue to monitor. If recurrent consider tilt table and echo   5/2019  Episodes of dizziness on exertion. Could be orthostatic or vagal.  Last echo shows no pulmonary hypertension. If recurrent consider tilt table testing. Will change timing of lisinopril to evening and metoprolol to morning    There are no discontinued medications. No orders of the defined types were placed in this encounter. Follow-up and Dispositions    · Return in about 6 months (around 11/20/2019).

## 2019-06-03 ENCOUNTER — HOSPITAL ENCOUNTER (OUTPATIENT)
Dept: ULTRASOUND IMAGING | Age: 69
Discharge: HOME | End: 2019-06-03
Attending: FAMILY MEDICINE
Payer: MEDICARE

## 2019-06-03 DIAGNOSIS — R31.29 MICROSCOPIC HEMATURIA: ICD-10-CM

## 2019-06-03 PROCEDURE — 76770 US EXAM ABDO BACK WALL COMP: CPT

## 2019-11-12 ENCOUNTER — OFFICE VISIT (OUTPATIENT)
Dept: CARDIOLOGY CLINIC | Age: 69
End: 2019-11-12

## 2019-11-12 VITALS
WEIGHT: 136.4 LBS | DIASTOLIC BLOOD PRESSURE: 61 MMHG | HEIGHT: 59 IN | BODY MASS INDEX: 27.5 KG/M2 | SYSTOLIC BLOOD PRESSURE: 140 MMHG | HEART RATE: 52 BPM

## 2019-11-12 DIAGNOSIS — I34.0 NON-RHEUMATIC MITRAL REGURGITATION: Primary | ICD-10-CM

## 2019-11-12 DIAGNOSIS — E78.5 HYPERLIPIDEMIA, UNSPECIFIED HYPERLIPIDEMIA TYPE: ICD-10-CM

## 2019-11-12 DIAGNOSIS — R42 DIZZINESS: ICD-10-CM

## 2019-11-12 NOTE — PROGRESS NOTES
1. Have you been to the ER, urgent care clinic since your last visit? Hospitalized since your last visit? No    2. Have you seen or consulted any other health care providers outside of the [address] [place] [address] since your last visit? Include any pap smears or colon screening.  Yes Where: GI

## 2019-11-12 NOTE — PROGRESS NOTES
HISTORY OF PRESENT ILLNESS  Shawnee Nieto is a 77 y.o. female. 5/2019  Occasional episode of palpitation. Also feels lightheaded with near passing out when she is working in garden in the daytime. Happens on and off for past few years. No complete syncope. Hypertension   The history is provided by the patient. This is a chronic problem. The problem occurs constantly. Palpitations    The history is provided by the patient. This is a new problem. The problem has been gradually improving. The problem occurs every several days. The problem is associated with nothing. Her past medical history is significant for hypertension. Review of Systems   Constitutional: Negative for chills. HENT: Negative for nosebleeds. Eyes: Negative for blurred vision and double vision. Respiratory: Negative for wheezing. Cardiovascular: Positive for palpitations. Negative for leg swelling. Gastrointestinal: Negative for heartburn. Musculoskeletal: Negative for myalgias. Skin: Negative for rash. Endo/Heme/Allergies: Does not bruise/bleed easily. Family History   Problem Relation Age of Onset    Heart Attack Mother     Stroke Mother     Heart Attack Maternal Aunt     Stroke Maternal Aunt     Heart Attack Maternal Uncle     Stroke Maternal Uncle     Heart Attack Maternal Grandmother     Stroke Maternal Grandmother        Past Medical History:   Diagnosis Date    Chest pain 3/30/2018    ?  atypical ? angina-chest wall-gerd     Diabetes mellitus     Essential hypertension     Hyperlipidemia 3/30/2018    on statin    Palpitation 3/30/2018    frequent pac     Type 2 diabetes mellitus without complication, without long-term current use of insulin ([facility] [address] 3/30/2018    stable       Past Surgical History:   Procedure Laterality Date    HX HEENT  1950's    eye surgery on both eyes  crossed eye    HX HEENT      eye lid surgery       Social History     Tobacco Use    Smoking status: Never Smoker    Smokeless tobacco: Never Used   Substance Use Topics    Alcohol use: No       No Known Allergies    Prior to Admission medications    Medication Sig Start Date End Date Taking? Authorizing Provider   doxazosin (CARDURA) 2 mg tablet Take 2 mg by mouth daily. Yes Provider, Historical   lisinopril (PRINIVIL, ZESTRIL) 40 mg tablet Take 40 mg by mouth daily. Yes Provider, Historical   hydroCHLOROthiazide (HYDRODIURIL) 12.5 mg tablet Take 12.5 mg by mouth daily. Yes Provider, Historical   cycloSPORINE (RESTASIS) 0.05 % ophthalmic emulsion Administer 1 Drop to both eyes two (2) times a day. Yes Provider, Historical   multivitamin (ONE A DAY) tablet Take 1 Tab by mouth daily. Yes Provider, Historical   aspirin delayed-release 81 mg tablet Take  by mouth daily. Yes Provider, Historical   cyanocobalamin 1,000 mcg tablet Take 1,000 mcg by mouth daily. Yes Provider, Historical   omega 3-DHA-EPA-fish oil 1,000 mg (120 mg-180 mg) capsule Take 1 Cap by mouth daily. Yes Provider, Historical   metoprolol-XL (TOPROL XL) 50 mg XL tablet Take  by mouth daily. Yes Provider, Historical   rosuvastatin (CRESTOR) 20 mg tablet Take 20 mg by mouth daily. Yes Provider, Historical   metFORMIN ER (GLUCOPHAGE XR) 500 mg tablet Take  by mouth two (2) times a day. Yes Provider, Historical         Visit Vitals  /61   Pulse (!) 52   Ht 4' 11\" (1.499 m)   Wt 61.9 kg (136 lb 6.4 oz)   BMI 27.55 kg/m²         Physical Exam   Constitutional: She is oriented to person, place, and time. She appears well-developed and well-nourished. HENT:   Head: Normocephalic and atraumatic. Eyes: Conjunctivae are normal.   Neck: Neck supple. No JVD present. No tracheal deviation present. No thyromegaly present. Cardiovascular: Normal rate and regular rhythm. PMI is not displaced. Exam reveals no gallop, no S3 and no decreased pulses. No murmur heard. Pulmonary/Chest: No respiratory distress. She has no wheezes. She has no rales. She exhibits no tenderness. Abdominal: Soft. There is no tenderness. Musculoskeletal: She exhibits no edema. Neurological: She is alert and oriented to person, place, and time. Skin: Skin is warm. Psychiatric: She has a normal mood and affect. Ms. Nieto has a reminder for a \"due or due soon\" health maintenance. I have asked that she contact her primary care provider for follow-up on this health maintenance. I Have personally reviewed recent relevant labs available and discussed with patient  tsh-lipids    holter-2018  Rhythm is sinus. 2. LA and QRS are within normal limits. 3. 54 single VEs. 4. 4497 single SVEs, 40 paired, 17 runs of SVT (maximum 5 beats in duration). 5. Patient diary was submitted with 2 episodes of palpitations. SUMMARY:stress test-2018  Left ventricle: Systolic function was normal. Ejection fraction was  estimated in the range of 55 % to 60 %. There were no regional wall motion  abnormalities. Mitral valve: There was mild regurgitation. Tricuspid valve: There was mild regurgitation. NUCLEAR IMAGIN      Findings:   1. Stress images reveal normal Myoview distrubution in all the LV segments in short axis, vertical and horizontal long axis views. 2. Resting images have a normal uptake. 3. Gated images reveal normal wall motion and the ejection fraction is calculated to be 78%. Conclusion:   1. Normal perfusion scan. 2. Normal wall motion and ejection fraction. 3. Low risk scan. Assessment         ICD-10-CM ICD-9-CM    1. Non-rheumatic mitral regurgitation I34.0 424.0     Stable asymptomatic monitor   2. Hyperlipidemia, unspecified hyperlipidemia type E78.5 272.4     Continue treatment lab with PCP   3. Dizziness R42 780.4     Stable monitor   2019  Episodes of dizziness on exertion. Could be orthostatic or vagal.  Last echo shows no pulmonary hypertension. If recurrent consider tilt table testing.   Will change timing of lisinopril to evening and metoprolol to morning    Medications Discontinued During This Encounter   Medication Reason    LACTOBACILLUS ACIDOPHILUS (PROBIOTIC PO) Therapy Completed       No orders of the defined types were placed in this encounter. Follow-up and Dispositions    · Return in about 6 months (around 5/12/2020).

## 2019-11-18 ENCOUNTER — HOSPITAL ENCOUNTER (OUTPATIENT)
Dept: MAMMOGRAPHY | Age: 69
Discharge: HOME | End: 2019-11-18
Attending: FAMILY MEDICINE
Payer: MEDICARE

## 2019-11-18 DIAGNOSIS — Z12.31 BREAST CANCER SCREENING BY MAMMOGRAM: ICD-10-CM

## 2019-11-18 PROCEDURE — 77063 BREAST TOMOSYNTHESIS BI: CPT

## 2020-09-15 ENCOUNTER — OFFICE VISIT (OUTPATIENT)
Dept: CARDIOLOGY CLINIC | Age: 70
End: 2020-09-15

## 2020-09-15 VITALS
HEIGHT: 59 IN | BODY MASS INDEX: 28.06 KG/M2 | WEIGHT: 139.2 LBS | DIASTOLIC BLOOD PRESSURE: 66 MMHG | TEMPERATURE: 97.2 F | SYSTOLIC BLOOD PRESSURE: 155 MMHG | HEART RATE: 61 BPM

## 2020-09-15 DIAGNOSIS — E78.5 HYPERLIPIDEMIA, UNSPECIFIED HYPERLIPIDEMIA TYPE: ICD-10-CM

## 2020-09-15 DIAGNOSIS — R42 DIZZINESS: ICD-10-CM

## 2020-09-15 DIAGNOSIS — I47.1 SVT (SUPRAVENTRICULAR TACHYCARDIA) (HCC): ICD-10-CM

## 2020-09-15 DIAGNOSIS — R00.2 PALPITATIONS: ICD-10-CM

## 2020-09-15 DIAGNOSIS — I34.0 NON-RHEUMATIC MITRAL REGURGITATION: Primary | ICD-10-CM

## 2020-09-15 NOTE — PROGRESS NOTES
HISTORY OF PRESENT ILLNESS  Veronica Nieto is a 72 y.o. female. 5/2019  Occasional episode of palpitation. Also feels lightheaded with near passing out when she is working in garden in the daytime. Happens on and off for past few years. No complete syncope. Hypertension   The history is provided by the patient. This is a chronic problem. The problem occurs constantly. Palpitations    The history is provided by the patient. This is a new problem. The problem has been gradually improving. The problem occurs every several days. The problem is associated with nothing. Her past medical history is significant for hypertension. Review of Systems   Constitutional: Negative for chills. HENT: Negative for nosebleeds. Eyes: Negative for blurred vision and double vision. Respiratory: Negative for wheezing. Cardiovascular: Positive for palpitations. Negative for leg swelling. Gastrointestinal: Negative for heartburn. Musculoskeletal: Negative for myalgias. Skin: Negative for rash. Endo/Heme/Allergies: Does not bruise/bleed easily. Family History   Problem Relation Age of Onset    Heart Attack Mother     Stroke Mother     Heart Attack Maternal Aunt     Stroke Maternal Aunt     Heart Attack Maternal Uncle     Stroke Maternal Uncle     Heart Attack Maternal Grandmother     Stroke Maternal Grandmother        Past Medical History:   Diagnosis Date    Chest pain 3/30/2018    ?  atypical ? angina-chest wall-gerd     Diabetes mellitus     Essential hypertension     Hyperlipidemia 3/30/2018    on statin    Palpitation 3/30/2018    frequent pac     Type 2 diabetes mellitus without complication, without long-term current use of insulin ([facility] [address] 3/30/2018    stable       Past Surgical History:   Procedure Laterality Date    HX HEENT  1950's    eye surgery on both eyes  crossed eye    HX HEENT      eye lid surgery       Social History     Tobacco Use    Smoking status: Never Smoker    Smokeless tobacco: Never Used   Substance Use Topics    Alcohol use: No       No Known Allergies    Prior to Admission medications    Medication Sig Start Date End Date Taking? Authorizing Provider   doxazosin (CARDURA) 2 mg tablet Take 2 mg by mouth daily. Yes Provider, Historical   lisinopril (PRINIVIL, ZESTRIL) 40 mg tablet Take 40 mg by mouth daily. Yes Provider, Historical   hydroCHLOROthiazide (HYDRODIURIL) 12.5 mg tablet Take 12.5 mg by mouth daily. Yes Provider, Historical   cycloSPORINE (RESTASIS) 0.05 % ophthalmic emulsion Administer 1 Drop to both eyes two (2) times a day. Yes Provider, Historical   multivitamin (ONE A DAY) tablet Take 1 Tab by mouth daily. Yes Provider, Historical   aspirin delayed-release 81 mg tablet Take  by mouth daily. Yes Provider, Historical   cyanocobalamin 1,000 mcg tablet Take 1,000 mcg by mouth daily. Yes Provider, Historical   omega 3-DHA-EPA-fish oil 1,000 mg (120 mg-180 mg) capsule Take 1 Cap by mouth daily. Yes Provider, Historical   metoprolol succinate (Toprol XL) 25 mg XL tablet Take 25 mg by mouth daily. Yes Provider, Historical   rosuvastatin (CRESTOR) 20 mg tablet Take 20 mg by mouth daily. Yes Provider, Historical   metFORMIN ER (GLUCOPHAGE XR) 500 mg tablet Take  by mouth two (2) times a day. Yes Provider, Historical         Visit Vitals  BP (!) 155/66   Pulse 61   Temp 97.2 °F (36.2 °C)   Ht 4' 11\" (1.499 m)   Wt 63.1 kg (139 lb 3.2 oz)   BMI 28.11 kg/m²         Physical Exam   Constitutional: She is oriented to person, place, and time. She appears well-developed and well-nourished. HENT:   Head: Normocephalic and atraumatic. Eyes: Conjunctivae are normal.   Neck: Neck supple. No JVD present. No tracheal deviation present. No thyromegaly present. Cardiovascular: Normal rate and regular rhythm. PMI is not displaced. Exam reveals no gallop, no S3 and no decreased pulses. No murmur heard. Pulmonary/Chest: No respiratory distress.  She has no wheezes. She has no rales. She exhibits no tenderness. Abdominal: Soft. There is no abdominal tenderness. Musculoskeletal:         General: No edema. Neurological: She is alert and oriented to person, place, and time. Skin: Skin is warm. Psychiatric: She has a normal mood and affect. Ms. Nieto has a reminder for a \"due or due soon\" health maintenance. I have asked that she contact her primary care provider for follow-up on this health maintenance. I Have personally reviewed recent relevant labs available and discussed with patient  tsh-lipids    holter-2018  Rhythm is sinus. 2. ME and QRS are within normal limits. 3. 54 single VEs. 4. 4497 single SVEs, 40 paired, 17 runs of SVT (maximum 5 beats in duration). 5. Patient diary was submitted with 2 episodes of palpitations. SUMMARY:stress test-2018  Left ventricle: Systolic function was normal. Ejection fraction was  estimated in the range of 55 % to 60 %. There were no regional wall motion  abnormalities. Mitral valve: There was mild regurgitation. Tricuspid valve: There was mild regurgitation. NUCLEAR IMAGIN      Findings:   1. Stress images reveal normal Myoview distrubution in all the LV segments in short axis, vertical and horizontal long axis views. 2. Resting images have a normal uptake. 3. Gated images reveal normal wall motion and the ejection fraction is calculated to be 78%. Conclusion:   1. Normal perfusion scan. 2. Normal wall motion and ejection fraction. 3. Low risk scan. Assessment         ICD-10-CM ICD-9-CM    1. Non-rheumatic mitral regurgitation  I34.0 424.0     Stable asymptomatic monitor   2. Hyperlipidemia, unspecified hyperlipidemia type  E78.5 272.4     Continue treatment lab with PCP   3. Dizziness  R42 780.4     Stable. Continue therapy monitor   4. Palpitations  R00.2 785.1 CARDIAC EVENT MONITOR    Recently increasing palpitation.   History of SVT in the past.  Recently metoprolol had to be reduced due to resting bradycardia. 5. SVT (supraventricular tachycardia) ([facility], [facility])  I47.1 427.89 CARDIAC EVENT MONITOR    Short SVT in past recent increase symptom   5/2019  Episodes of dizziness on exertion. Could be orthostatic or vagal.  Last echo shows no pulmonary hypertension. If recurrent consider tilt table testing. Will change timing of lisinopril to evening and metoprolol to morning  9/2020  Symptom of palpitation and SVT. Check event monitor metoprolol was reduced recently due to resting bradycardia. There are no discontinued medications. No orders of the defined types were placed in this encounter. Follow-up and Dispositions    · Return in about 6 weeks (around 10/27/2020).

## 2020-09-15 NOTE — PROGRESS NOTES
1. Have you been to the ER, urgent care clinic since your last visit? Hospitalized since your last visit?     no    2. Have you seen or consulted any other health care providers outside of the [address] [place] [address] since your last visit? Include any pap smears or colon screening. Yes Where: pcp     3. Since your last visit, have you had any of the following symptoms?      palpitations.

## 2020-11-02 ENCOUNTER — OFFICE VISIT (OUTPATIENT)
Dept: CARDIOLOGY CLINIC | Age: 70
End: 2020-11-02
Payer: MEDICARE

## 2020-11-02 VITALS
HEIGHT: 59 IN | SYSTOLIC BLOOD PRESSURE: 152 MMHG | DIASTOLIC BLOOD PRESSURE: 56 MMHG | WEIGHT: 133 LBS | BODY MASS INDEX: 26.81 KG/M2 | HEART RATE: 57 BPM | TEMPERATURE: 97 F

## 2020-11-02 DIAGNOSIS — R00.2 PALPITATIONS: Primary | ICD-10-CM

## 2020-11-02 DIAGNOSIS — E78.5 HYPERLIPIDEMIA, UNSPECIFIED HYPERLIPIDEMIA TYPE: ICD-10-CM

## 2020-11-02 DIAGNOSIS — I47.1 SVT (SUPRAVENTRICULAR TACHYCARDIA) (HCC): ICD-10-CM

## 2020-11-02 DIAGNOSIS — R00.2 PALPITATIONS: ICD-10-CM

## 2020-11-02 DIAGNOSIS — I34.0 NON-RHEUMATIC MITRAL REGURGITATION: ICD-10-CM

## 2020-11-02 PROCEDURE — G8427 DOCREV CUR MEDS BY ELIG CLIN: HCPCS | Performed by: INTERNAL MEDICINE

## 2020-11-02 PROCEDURE — 1101F PT FALLS ASSESS-DOCD LE1/YR: CPT | Performed by: INTERNAL MEDICINE

## 2020-11-02 PROCEDURE — G8536 NO DOC ELDER MAL SCRN: HCPCS | Performed by: INTERNAL MEDICINE

## 2020-11-02 PROCEDURE — G8432 DEP SCR NOT DOC, RNG: HCPCS | Performed by: INTERNAL MEDICINE

## 2020-11-02 PROCEDURE — G8399 PT W/DXA RESULTS DOCUMENT: HCPCS | Performed by: INTERNAL MEDICINE

## 2020-11-02 PROCEDURE — 1090F PRES/ABSN URINE INCON ASSESS: CPT | Performed by: INTERNAL MEDICINE

## 2020-11-02 PROCEDURE — G9899 SCRN MAM PERF RSLTS DOC: HCPCS | Performed by: INTERNAL MEDICINE

## 2020-11-02 PROCEDURE — G8419 CALC BMI OUT NRM PARAM NOF/U: HCPCS | Performed by: INTERNAL MEDICINE

## 2020-11-02 PROCEDURE — 99213 OFFICE O/P EST LOW 20 MIN: CPT | Performed by: INTERNAL MEDICINE

## 2020-11-02 PROCEDURE — 3017F COLORECTAL CA SCREEN DOC REV: CPT | Performed by: INTERNAL MEDICINE

## 2020-11-02 NOTE — PROGRESS NOTES
1. Have you been to the ER, urgent care clinic since your last visit? Hospitalized since your last visit?     no  2. Have you seen or consulted any other health care providers outside of the [address] [place] [address] since your last visit? Include any pap smears or colon screening.       Yes Where: pcp

## 2020-11-02 NOTE — PROGRESS NOTES
HISTORY OF PRESENT ILLNESS  Thomas Nieto is a 72 y.o. female. 5/2019  Occasional episode of palpitation. Also feels lightheaded with near passing out when she is working in garden in the daytime. Happens on and off for past few years. No complete syncope. Hypertension   The history is provided by the patient. This is a chronic problem. The problem occurs constantly. Palpitations    The history is provided by the patient. This is a new problem. The problem has been gradually improving. The problem occurs every several days. The problem is associated with nothing. Her past medical history is significant for hypertension. SVT         Review of Systems   Constitutional: Negative for chills. HENT: Negative for nosebleeds. Eyes: Negative for blurred vision and double vision. Respiratory: Negative for wheezing. Cardiovascular: Positive for palpitations. Negative for leg swelling. Gastrointestinal: Negative for heartburn. Musculoskeletal: Negative for myalgias. Skin: Negative for rash. Endo/Heme/Allergies: Does not bruise/bleed easily. Family History   Problem Relation Age of Onset    Heart Attack Mother     Stroke Mother     Heart Attack Maternal Aunt     Stroke Maternal Aunt     Heart Attack Maternal Uncle     Stroke Maternal Uncle     Heart Attack Maternal Grandmother     Stroke Maternal Grandmother        Past Medical History:   Diagnosis Date    Chest pain 3/30/2018    ?  atypical ? angina-chest wall-gerd     Diabetes mellitus     Essential hypertension     Hyperlipidemia 3/30/2018    on statin    Palpitation 3/30/2018    frequent pac     Type 2 diabetes mellitus without complication, without long-term current use of insulin ([facility] [address] 3/30/2018    stable       Past Surgical History:   Procedure Laterality Date    HX HEENT  1950's    eye surgery on both eyes  crossed eye    HX HEENT      eye lid surgery       Social History     Tobacco Use    Smoking status: Never Smoker  Smokeless tobacco: Never Used   Substance Use Topics    Alcohol use: No       No Known Allergies    Prior to Admission medications    Medication Sig Start Date End Date Taking? Authorizing Provider   lisinopril (PRINIVIL, ZESTRIL) 40 mg tablet Take 40 mg by mouth daily. Yes Provider, Historical   hydroCHLOROthiazide (HYDRODIURIL) 12.5 mg tablet Take 12.5 mg by mouth daily. Yes Provider, Historical   cycloSPORINE (RESTASIS) 0.05 % ophthalmic emulsion Administer 1 Drop to both eyes two (2) times a day. Yes Provider, Historical   multivitamin (ONE A DAY) tablet Take 1 Tab by mouth daily. Yes Provider, Historical   aspirin delayed-release 81 mg tablet Take  by mouth daily. Yes Provider, Historical   cyanocobalamin 1,000 mcg tablet Take 1,000 mcg by mouth daily. Yes Provider, Historical   omega 3-DHA-EPA-fish oil 1,000 mg (120 mg-180 mg) capsule Take 1 Cap by mouth daily. Yes Provider, Historical   metoprolol succinate (Toprol XL) 25 mg XL tablet Take 25 mg by mouth daily. Yes Provider, Historical   rosuvastatin (CRESTOR) 20 mg tablet Take 20 mg by mouth daily. Yes Provider, Historical         Visit Vitals  BP (!) 152/56   Pulse (!) 57   Temp 97 °F (36.1 °C) (Temporal)   Ht 4' 11\" (1.499 m)   Wt 60.3 kg (133 lb)   BMI 26.86 kg/m²         Physical Exam   Constitutional: She is oriented to person, place, and time. She appears well-developed and well-nourished. HENT:   Head: Normocephalic and atraumatic. Eyes: Conjunctivae are normal.   Neck: Neck supple. No JVD present. No tracheal deviation present. No thyromegaly present. Cardiovascular: Normal rate and regular rhythm. PMI is not displaced. Exam reveals no gallop, no S3 and no decreased pulses. No murmur heard. Pulmonary/Chest: No respiratory distress. She has no wheezes. She has no rales. She exhibits no tenderness. Abdominal: Soft. There is no abdominal tenderness. Musculoskeletal:         General: No edema.    Neurological: She is alert and oriented to person, place, and time. Skin: Skin is warm. Psychiatric: She has a normal mood and affect. Ms. Nieto has a reminder for a \"due or due soon\" health maintenance. I have asked that she contact her primary care provider for follow-up on this health maintenance. I Have personally reviewed recent relevant labs available and discussed with patient  tsh-lipids    holter-2018  Rhythm is sinus. 2. CO and QRS are within normal limits. 3. 54 single VEs. 4. 4497 single SVEs, 40 paired, 17 runs of SVT (maximum 5 beats in duration). 5. Patient diary was submitted with 2 episodes of palpitations. SUMMARY:stress test-2018  Left ventricle: Systolic function was normal. Ejection fraction was  estimated in the range of 55 % to 60 %. There were no regional wall motion  abnormalities. Mitral valve: There was mild regurgitation. Tricuspid valve: There was mild regurgitation. NUCLEAR IMAGIN      Findings:   1. Stress images reveal normal Myoview distrubution in all the LV segments in short axis, vertical and horizontal long axis views. 2. Resting images have a normal uptake. 3. Gated images reveal normal wall motion and the ejection fraction is calculated to be 78%. Conclusion:   1. Normal perfusion scan. 2. Normal wall motion and ejection fraction. 3. Low risk scan. 10/2020  MCOTfrequent PAC. 1 4 beat VT. Short atrial run    Assessment         ICD-10-CM ICD-9-CM    1. Palpitations  R00.2 785.1     Frequent PACs on MCOT. Continue current dose of Toprol. If symptomatic increase it   2. Non-rheumatic mitral regurgitation  I34.0 424.0     Stable. 3. Hyperlipidemia, unspecified hyperlipidemia type  E78.5 272.4     Continue treatment lab with PCP   2019  Episodes of dizziness on exertion. Could be orthostatic or vagal.  Last echo shows no pulmonary hypertension. If recurrent consider tilt table testing.   Will change timing of lisinopril to evening and metoprolol to morning  9/2020  Symptom of palpitation and SVT. Check event monitor metoprolol was reduced recently due to resting bradycardia. 11/2020  MCOT reveals frequent PAC. No significant sustained arrhythmia. We will continue current dose of Toprol as symptoms are stable. Currently is not taking Cardura as blood pressure was low at home. She will resume if pressure remains high. Medications Discontinued During This Encounter   Medication Reason    doxazosin (CARDURA) 2 mg tablet Not A Current Medication    metFORMIN ER (GLUCOPHAGE XR) 500 mg tablet Not A Current Medication       No orders of the defined types were placed in this encounter. Follow-up and Dispositions    · Return in about 6 months (around 5/2/2021).

## 2020-11-19 ENCOUNTER — HOSPITAL ENCOUNTER (OUTPATIENT)
Dept: BONE DENSITY | Age: 70
Discharge: HOME | End: 2020-11-19
Attending: FAMILY MEDICINE
Payer: MEDICARE

## 2020-11-19 ENCOUNTER — HOSPITAL ENCOUNTER (OUTPATIENT)
Dept: MAMMOGRAPHY | Age: 70
Discharge: HOME | End: 2020-11-19
Attending: FAMILY MEDICINE
Payer: MEDICARE

## 2020-11-19 DIAGNOSIS — M85.80 OSTEOPENIA: ICD-10-CM

## 2020-11-19 DIAGNOSIS — Z78.0 MENOPAUSE: ICD-10-CM

## 2020-11-19 DIAGNOSIS — Z12.31 VISIT FOR SCREENING MAMMOGRAM: ICD-10-CM

## 2020-11-19 PROCEDURE — 77063 BREAST TOMOSYNTHESIS BI: CPT

## 2020-11-19 PROCEDURE — 77080 DXA BONE DENSITY AXIAL: CPT

## 2021-05-04 ENCOUNTER — OFFICE VISIT (OUTPATIENT)
Dept: CARDIOLOGY CLINIC | Age: 71
End: 2021-05-04
Payer: MEDICARE

## 2021-05-04 VITALS
HEIGHT: 59 IN | BODY MASS INDEX: 28.83 KG/M2 | WEIGHT: 143 LBS | HEART RATE: 55 BPM | DIASTOLIC BLOOD PRESSURE: 64 MMHG | SYSTOLIC BLOOD PRESSURE: 159 MMHG

## 2021-05-04 DIAGNOSIS — R00.2 PALPITATIONS: ICD-10-CM

## 2021-05-04 DIAGNOSIS — I10 ESSENTIAL HYPERTENSION: ICD-10-CM

## 2021-05-04 DIAGNOSIS — I47.1 SVT (SUPRAVENTRICULAR TACHYCARDIA) (HCC): ICD-10-CM

## 2021-05-04 DIAGNOSIS — E78.5 HYPERLIPIDEMIA, UNSPECIFIED HYPERLIPIDEMIA TYPE: ICD-10-CM

## 2021-05-04 DIAGNOSIS — I34.0 NON-RHEUMATIC MITRAL REGURGITATION: Primary | ICD-10-CM

## 2021-05-04 PROCEDURE — G8510 SCR DEP NEG, NO PLAN REQD: HCPCS | Performed by: INTERNAL MEDICINE

## 2021-05-04 PROCEDURE — 1090F PRES/ABSN URINE INCON ASSESS: CPT | Performed by: INTERNAL MEDICINE

## 2021-05-04 PROCEDURE — G8399 PT W/DXA RESULTS DOCUMENT: HCPCS | Performed by: INTERNAL MEDICINE

## 2021-05-04 PROCEDURE — G8427 DOCREV CUR MEDS BY ELIG CLIN: HCPCS | Performed by: INTERNAL MEDICINE

## 2021-05-04 PROCEDURE — G8419 CALC BMI OUT NRM PARAM NOF/U: HCPCS | Performed by: INTERNAL MEDICINE

## 2021-05-04 PROCEDURE — G8753 SYS BP > OR = 140: HCPCS | Performed by: INTERNAL MEDICINE

## 2021-05-04 PROCEDURE — G8754 DIAS BP LESS 90: HCPCS | Performed by: INTERNAL MEDICINE

## 2021-05-04 PROCEDURE — 99214 OFFICE O/P EST MOD 30 MIN: CPT | Performed by: INTERNAL MEDICINE

## 2021-05-04 PROCEDURE — G9899 SCRN MAM PERF RSLTS DOC: HCPCS | Performed by: INTERNAL MEDICINE

## 2021-05-04 PROCEDURE — G8536 NO DOC ELDER MAL SCRN: HCPCS | Performed by: INTERNAL MEDICINE

## 2021-05-04 PROCEDURE — 1101F PT FALLS ASSESS-DOCD LE1/YR: CPT | Performed by: INTERNAL MEDICINE

## 2021-05-04 PROCEDURE — 3017F COLORECTAL CA SCREEN DOC REV: CPT | Performed by: INTERNAL MEDICINE

## 2021-05-04 RX ORDER — DOXAZOSIN 2 MG/1
2 TABLET ORAL DAILY
COMMUNITY

## 2021-05-04 NOTE — PROGRESS NOTES
HISTORY OF PRESENT ILLNESS  Duayne Gaucher Rash is a 80 y.o. female. 5/2019  Occasional episode of palpitation. Also feels lightheaded with near passing out when she is working in garden in the daytime. Happens on and off for past few years. No complete syncope. SVT    Hypertension  The history is provided by the patient. This is a chronic problem. The problem occurs constantly. Palpitations   The history is provided by the patient. This is a new problem. The problem has been gradually improving. The problem occurs every several days. The problem is associated with nothing. Her past medical history is significant for hypertension. Cholesterol Problem        Review of Systems   Constitutional: Negative for chills. HENT: Negative for nosebleeds. Eyes: Negative for blurred vision and double vision. Respiratory: Negative for wheezing. Cardiovascular: Positive for palpitations. Negative for leg swelling. Gastrointestinal: Negative for heartburn. Musculoskeletal: Negative for myalgias. Skin: Negative for rash. Endo/Heme/Allergies: Does not bruise/bleed easily. Family History   Problem Relation Age of Onset    Heart Attack Mother     Stroke Mother     Heart Attack Maternal Aunt     Stroke Maternal Aunt     Heart Attack Maternal Uncle     Stroke Maternal Uncle     Heart Attack Maternal Grandmother     Stroke Maternal Grandmother        Past Medical History:   Diagnosis Date    Chest pain 3/30/2018    ?  atypical ? angina-chest wall-gerd     Diabetes mellitus     Essential hypertension     Hyperlipidemia 3/30/2018    on statin    Palpitation 3/30/2018    frequent pac     Type 2 diabetes mellitus without complication, without long-term current use of insulin ([facility] [address] 3/30/2018    stable       Past Surgical History:   Procedure Laterality Date    HX HEENT  1950's    eye surgery on both eyes  crossed eye    HX HEENT      eye lid surgery       Social History     Tobacco Use    Smoking status: Never Smoker    Smokeless tobacco: Never Used   Substance Use Topics    Alcohol use: No       No Known Allergies    Prior to Admission medications    Medication Sig Start Date End Date Taking? Authorizing Provider   doxazosin (Cardura) 2 mg tablet Take 2 mg by mouth daily. Yes Provider, Historical   lisinopril (PRINIVIL, ZESTRIL) 40 mg tablet Take 40 mg by mouth daily. Yes Provider, Historical   hydroCHLOROthiazide (HYDRODIURIL) 12.5 mg tablet Take 12.5 mg by mouth daily. Yes Provider, Historical   cycloSPORINE (RESTASIS) 0.05 % ophthalmic emulsion Administer 1 Drop to both eyes two (2) times a day. Yes Provider, Historical   multivitamin (ONE A DAY) tablet Take 1 Tab by mouth daily. Yes Provider, Historical   aspirin delayed-release 81 mg tablet Take  by mouth daily. Yes Provider, Historical   cyanocobalamin 1,000 mcg tablet Take 1,000 mcg by mouth daily. Yes Provider, Historical   omega 3-DHA-EPA-fish oil 1,000 mg (120 mg-180 mg) capsule Take 1 Cap by mouth daily. Yes Provider, Historical   metoprolol succinate (Toprol XL) 25 mg XL tablet Take 25 mg by mouth daily. Yes Provider, Historical   rosuvastatin (CRESTOR) 20 mg tablet Take 20 mg by mouth daily. Yes Provider, Historical         Visit Vitals  BP (!) 159/64   Pulse (!) 55   Ht 4' 11\" (1.499 m)   Wt 64.9 kg (143 lb)   BMI 28.88 kg/m²         Physical Exam   Constitutional: She is oriented to person, place, and time. She appears well-developed and well-nourished. HENT:   Head: Normocephalic and atraumatic. Eyes: Conjunctivae are normal.   Neck: Neck supple. No JVD present. No tracheal deviation present. No thyromegaly present. Cardiovascular: Normal rate and regular rhythm. PMI is not displaced. Exam reveals no gallop, no S3 and no decreased pulses. No murmur heard. Pulmonary/Chest: No respiratory distress. She has no wheezes. She has no rales. She exhibits no tenderness. Abdominal: Soft.  There is no abdominal tenderness. Musculoskeletal:         General: No edema. Neurological: She is alert and oriented to person, place, and time. Skin: Skin is warm. Psychiatric: She has a normal mood and affect. Ms. Nieto has a reminder for a \"due or due soon\" health maintenance. I have asked that she contact her primary care provider for follow-up on this health maintenance. I Have personally reviewed recent relevant labs available and discussed with patient  tsh-lipids    holter-2018  Rhythm is sinus. 2. CA and QRS are within normal limits. 3. 54 single VEs. 4. 4497 single SVEs, 40 paired, 17 runs of SVT (maximum 5 beats in duration). 5. Patient diary was submitted with 2 episodes of palpitations. SUMMARY:stress test-2018  Left ventricle: Systolic function was normal. Ejection fraction was  estimated in the range of 55 % to 60 %. There were no regional wall motion  abnormalities. Mitral valve: There was mild regurgitation. Tricuspid valve: There was mild regurgitation. NUCLEAR IMAGIN      Findings:   1. Stress images reveal normal Myoview distrubution in all the LV segments in short axis, vertical and horizontal long axis views. 2. Resting images have a normal uptake. 3. Gated images reveal normal wall motion and the ejection fraction is calculated to be 78%. Conclusion:   1. Normal perfusion scan. 2. Normal wall motion and ejection fraction. 3. Low risk scan. 10[facility]  [facility] 1 4 beat VT. Short atrial run    Assessment         ICD-10-CM ICD-9-CM    1. Non-rheumatic mitral regurgitation  I34.0 424.0     Stable monitor continue treatment   2. SVT (supraventricular tachycardia) (HCC)  I47.1 427.89     Continue treatment. No significant SVT noted on last monitoring   3. Palpitations  R00.2 785.1     PACs which were frequent. Continue beta-blocker   4. Essential hypertension  I10 401.9     Elevated blood pressure in office.   We will continue to monitor she is currently back on Cardura 2 mg. She will increase it to 4 mg if needed. 5. Hyperlipidemia, unspecified hyperlipidemia type  E78.5 272.4     Continue treatment lab with PCP   5/2019  Episodes of dizziness on exertion. Could be orthostatic or vagal.  Last echo shows no pulmonary hypertension. If recurrent consider tilt table testing. Will change timing of lisinopril to evening and metoprolol to morning  9/2020  Symptom of palpitation and SVT. Check event monitor metoprolol was reduced recently due to resting bradycardia. 11/2020  MCOT reveals frequent PAC. No significant sustained arrhythmia. We will continue current dose of Toprol as symptoms are stable. Currently is not taking Cardura as blood pressure was low at home. She will resume if pressure remains high.  5/2021  Cardiac status stable. Continues to have PAC. Symptoms stable. Continue treatment. Elevated blood pressure back on Cardura 2 mg. Will increase back to 40 mg if needed. Now off Metformin and sugars are controlled  There are no discontinued medications. No orders of the defined types were placed in this encounter. Follow-up and Dispositions    · Return in about 6 months (around 11/4/2021).

## 2021-05-04 NOTE — PROGRESS NOTES
1. Have you been to the ER, urgent care clinic since your last visit? Hospitalized since your last visit?     no2. Have you seen or consulted any other health care providers outside of the [address] [place] [address] since your last visit? Include any pap smears or colon screening. No     3. Since your last visit, have you had any of the following symptoms?      swelling in legs/arms.

## 2021-11-15 ENCOUNTER — OFFICE VISIT (OUTPATIENT)
Dept: CARDIOLOGY CLINIC | Age: 71
End: 2021-11-15
Payer: MEDICARE

## 2021-11-15 VITALS
BODY MASS INDEX: 27.42 KG/M2 | WEIGHT: 136 LBS | HEIGHT: 59 IN | DIASTOLIC BLOOD PRESSURE: 51 MMHG | OXYGEN SATURATION: 98 % | HEART RATE: 61 BPM | SYSTOLIC BLOOD PRESSURE: 128 MMHG

## 2021-11-15 DIAGNOSIS — I47.1 SVT (SUPRAVENTRICULAR TACHYCARDIA) (HCC): Primary | ICD-10-CM

## 2021-11-15 DIAGNOSIS — E78.5 HYPERLIPIDEMIA, UNSPECIFIED HYPERLIPIDEMIA TYPE: ICD-10-CM

## 2021-11-15 DIAGNOSIS — I34.0 NON-RHEUMATIC MITRAL REGURGITATION: ICD-10-CM

## 2021-11-15 DIAGNOSIS — I10 ESSENTIAL HYPERTENSION: ICD-10-CM

## 2021-11-15 PROCEDURE — G8432 DEP SCR NOT DOC, RNG: HCPCS | Performed by: INTERNAL MEDICINE

## 2021-11-15 PROCEDURE — 3017F COLORECTAL CA SCREEN DOC REV: CPT | Performed by: INTERNAL MEDICINE

## 2021-11-15 PROCEDURE — G8419 CALC BMI OUT NRM PARAM NOF/U: HCPCS | Performed by: INTERNAL MEDICINE

## 2021-11-15 PROCEDURE — G8427 DOCREV CUR MEDS BY ELIG CLIN: HCPCS | Performed by: INTERNAL MEDICINE

## 2021-11-15 PROCEDURE — G8536 NO DOC ELDER MAL SCRN: HCPCS | Performed by: INTERNAL MEDICINE

## 2021-11-15 PROCEDURE — 99214 OFFICE O/P EST MOD 30 MIN: CPT | Performed by: INTERNAL MEDICINE

## 2021-11-15 PROCEDURE — G8754 DIAS BP LESS 90: HCPCS | Performed by: INTERNAL MEDICINE

## 2021-11-15 PROCEDURE — 1090F PRES/ABSN URINE INCON ASSESS: CPT | Performed by: INTERNAL MEDICINE

## 2021-11-15 PROCEDURE — G8399 PT W/DXA RESULTS DOCUMENT: HCPCS | Performed by: INTERNAL MEDICINE

## 2021-11-15 PROCEDURE — G9899 SCRN MAM PERF RSLTS DOC: HCPCS | Performed by: INTERNAL MEDICINE

## 2021-11-15 PROCEDURE — 1101F PT FALLS ASSESS-DOCD LE1/YR: CPT | Performed by: INTERNAL MEDICINE

## 2021-11-15 PROCEDURE — G8752 SYS BP LESS 140: HCPCS | Performed by: INTERNAL MEDICINE

## 2021-11-15 NOTE — PROGRESS NOTES
HISTORY OF PRESENT ILLNESS  Karen Nieto is a 80 y.o. female. 5/2019  Occasional episode of palpitation. Also feels lightheaded with near passing out when she is working in garden in the daytime. Happens on and off for past few years. No complete syncope. SVT    Cholesterol Problem    Hypertension  The history is provided by the patient. This is a chronic problem. The problem occurs constantly. Palpitations   The history is provided by the patient. This is a new problem. The problem has been gradually improving. The problem occurs every several days. The problem is associated with nothing. Her past medical history is significant for hypertension. Follow-up        Review of Systems   Constitutional: Negative for chills. HENT: Negative for nosebleeds. Eyes: Negative for blurred vision and double vision. Respiratory: Negative for wheezing. Cardiovascular: Positive for palpitations. Negative for leg swelling. Gastrointestinal: Negative for heartburn. Musculoskeletal: Negative for myalgias. Skin: Negative for rash. Endo/Heme/Allergies: Does not bruise/bleed easily. Family History   Problem Relation Age of Onset    Heart Attack Mother     Stroke Mother     Heart Attack Maternal Aunt     Stroke Maternal Aunt     Heart Attack Maternal Uncle     Stroke Maternal Uncle     Heart Attack Maternal Grandmother     Stroke Maternal Grandmother        Past Medical History:   Diagnosis Date    Chest pain 3/30/2018    ?  atypical ? angina-chest wall-gerd     Diabetes mellitus     Essential hypertension     Hyperlipidemia 3/30/2018    on statin    Palpitation 3/30/2018    frequent pac     Type 2 diabetes mellitus without complication, without long-term current use of insulin ([facility] [address] 3/30/2018    stable       Past Surgical History:   Procedure Laterality Date    HX HEENT  1950's    eye surgery on both eyes  crossed eye    HX HEENT      eye lid surgery       Social History     Tobacco Use  Smoking status: Never Smoker    Smokeless tobacco: Never Used   Substance Use Topics    Alcohol use: No       No Known Allergies    Prior to Admission medications    Medication Sig Start Date End Date Taking? Authorizing Provider   latanoprost (XALATAN) 0.005 % ophthalmic solution INSTILL 1 DROP INTO EACH EYE ONCE DAILY AT BEDTIME 5/2/21  Yes Provider, Historical   doxazosin (Cardura) 2 mg tablet Take 2 mg by mouth daily. Yes Provider, Historical   lisinopril (PRINIVIL, ZESTRIL) 40 mg tablet Take 40 mg by mouth daily. Yes Provider, Historical   hydroCHLOROthiazide (HYDRODIURIL) 12.5 mg tablet Take 12.5 mg by mouth daily. Yes Provider, Historical   cycloSPORINE (RESTASIS) 0.05 % ophthalmic emulsion Administer 1 Drop to both eyes two (2) times a day. Yes Provider, Historical   multivitamin (ONE A DAY) tablet Take 1 Tab by mouth daily. Yes Provider, Historical   aspirin delayed-release 81 mg tablet Take  by mouth daily. Yes Provider, Historical   cyanocobalamin 1,000 mcg tablet Take 1,000 mcg by mouth daily. Yes Provider, Historical   omega 3-DHA-EPA-fish oil 1,000 mg (120 mg-180 mg) capsule Take 1 Cap by mouth daily. Yes Provider, Historical   metoprolol succinate (Toprol XL) 25 mg XL tablet Take 25 mg by mouth daily. Yes Provider, Historical   rosuvastatin (CRESTOR) 20 mg tablet Take 10 mg by mouth daily. Yes Provider, Historical         Visit Vitals  BP (!) 128/51 (BP 1 Location: Right upper arm, BP Patient Position: Sitting, BP Cuff Size: Adult)   Pulse 61   Ht 4' 11\" (1.499 m)   Wt 61.7 kg (136 lb)   SpO2 98%   BMI 27.47 kg/m²         Physical Exam  Constitutional:       Appearance: She is well-developed. HENT:      Head: Normocephalic and atraumatic. Eyes:      Conjunctiva/sclera: Conjunctivae normal.   Neck:      Thyroid: No thyromegaly. Vascular: No JVD. Trachea: No tracheal deviation. Cardiovascular:      Rate and Rhythm: Normal rate and regular rhythm.       Chest Wall: PMI is not displaced. Pulses: No decreased pulses. Heart sounds: No murmur heard. No gallop. No S3 sounds. Pulmonary:      Effort: No respiratory distress. Breath sounds: No wheezing or rales. Chest:      Chest wall: No tenderness. Abdominal:      Palpations: Abdomen is soft. Tenderness: There is no abdominal tenderness. Musculoskeletal:      Cervical back: Neck supple. Skin:     General: Skin is warm. Neurological:      Mental Status: She is alert and oriented to person, place, and time. Ms. Nieto has a reminder for a \"due or due soon\" health maintenance. I have asked that she contact her primary care provider for follow-up on this health maintenance. I Have personally reviewed recent relevant labs available and discussed with patient  tsh-lipids    holter-2018  Rhythm is sinus. 2. MN and QRS are within normal limits. 3. 54 single VEs. 4. 4497 single SVEs, 40 paired, 17 runs of SVT (maximum 5 beats in duration). 5. Patient diary was submitted with 2 episodes of palpitations. SUMMARY:stress test-2018  Left ventricle: Systolic function was normal. Ejection fraction was  estimated in the range of 55 % to 60 %. There were no regional wall motion  abnormalities. Mitral valve: There was mild regurgitation. Tricuspid valve: There was mild regurgitation. NUCLEAR IMAGIN      Findings:   1. Stress images reveal normal Myoview distrubution in all the LV segments in short axis, vertical and horizontal long axis views. 2. Resting images have a normal uptake. 3. Gated images reveal normal wall motion and the ejection fraction is calculated to be 78%. Conclusion:   1. Normal perfusion scan. 2. Normal wall motion and ejection fraction. 3. Low risk scan. 10/2020  OT-frequent PAC. 1 4 beat VT. Short atrial run    Assessment         ICD-10-CM ICD-9-CM    1.  SVT (supraventricular tachycardia) (HCC)  I47.1 427.89     Stable symptoms continue treatment monitor   2. Non-rheumatic mitral regurgitation  I34.0 424.0     Stable monitor   3. Hyperlipidemia, unspecified hyperlipidemia type  E78.5 272.4     Continue treatment lab with PCP   4. Essential hypertension  I10 401.9     Stable continue current treatment   5/2019  Episodes of dizziness on exertion. Could be orthostatic or vagal.  Last echo shows no pulmonary hypertension. If recurrent consider tilt table testing. Will change timing of lisinopril to evening and metoprolol to morning  9/2020  Symptom of palpitation and SVT. Check event monitor metoprolol was reduced recently due to resting bradycardia. 11/2020  MCOT reveals frequent PAC. No significant sustained arrhythmia. We will continue current dose of Toprol as symptoms are stable. Currently is not taking Cardura as blood pressure was low at home. She will resume if pressure remains high.  5/2021  Cardiac status stable. Continues to have PAC. Symptoms stable. Continue treatment. Elevated blood pressure back on Cardura 2 mg. Will increase back to 40 mg if needed. Now off Metformin and sugars are controlled  11/2021  Stable cardiac status. Continues to have PAC. Symptoms stable blood pressure controlled continue treatment back and neck pain likely related to arthritis  There are no discontinued medications. No orders of the defined types were placed in this encounter. Follow-up and Dispositions    · Return in about 6 months (around 5/15/2022).

## 2021-11-15 NOTE — PROGRESS NOTES
1. Have you been to the ER, urgent care clinic since your last visit? Hospitalized since your last visit? No    2. Have you seen or consulted any other health care providers outside of the [address] [place] [address] since your last visit? Include any pap smears or colon screening.  No

## 2021-12-17 ENCOUNTER — TRANSCRIBE ORDER (OUTPATIENT)
Dept: SCHEDULING | Age: 71
End: 2021-12-17

## 2021-12-17 DIAGNOSIS — M25.559 PAIN, HIP: ICD-10-CM

## 2021-12-17 DIAGNOSIS — M54.2 CERVICALGIA: ICD-10-CM

## 2021-12-17 DIAGNOSIS — M54.16 LUMBAR RADICULOPATHY, CHRONIC: Primary | ICD-10-CM

## 2021-12-28 ENCOUNTER — HOSPITAL ENCOUNTER (OUTPATIENT)
Age: 71
Discharge: HOME | End: 2021-12-28
Attending: FAMILY MEDICINE
Payer: MEDICARE

## 2021-12-28 DIAGNOSIS — M54.16 LUMBAR RADICULOPATHY, CHRONIC: ICD-10-CM

## 2021-12-28 DIAGNOSIS — M54.2 CERVICALGIA: ICD-10-CM

## 2021-12-28 DIAGNOSIS — M25.559 PAIN, HIP: ICD-10-CM

## 2021-12-28 PROCEDURE — 72148 MRI LUMBAR SPINE W/O DYE: CPT

## 2022-03-16 ENCOUNTER — TRANSCRIBE ORDER (OUTPATIENT)
Dept: SCHEDULING | Age: 72
End: 2022-03-16

## 2022-03-16 DIAGNOSIS — Z12.31 ENCOUNTER FOR SCREENING MAMMOGRAM FOR MALIGNANT NEOPLASM OF BREAST: Primary | ICD-10-CM

## 2022-03-18 PROBLEM — I47.1 SVT (SUPRAVENTRICULAR TACHYCARDIA) (HCC): Status: ACTIVE | Noted: 2020-09-15

## 2022-03-18 PROBLEM — I47.10 SVT (SUPRAVENTRICULAR TACHYCARDIA): Status: ACTIVE | Noted: 2020-09-15

## 2022-03-19 PROBLEM — R00.2 PALPITATION: Status: ACTIVE | Noted: 2018-03-30

## 2022-03-19 PROBLEM — R07.9 CHEST PAIN: Status: ACTIVE | Noted: 2018-03-30

## 2022-03-19 PROBLEM — E78.5 HYPERLIPIDEMIA: Status: ACTIVE | Noted: 2018-03-30

## 2022-03-20 PROBLEM — E11.9 TYPE 2 DIABETES MELLITUS WITHOUT COMPLICATION, WITHOUT LONG-TERM CURRENT USE OF INSULIN (HCC): Status: ACTIVE | Noted: 2018-03-30

## 2022-03-24 ENCOUNTER — TRANSCRIBE ORDER (OUTPATIENT)
Dept: SCHEDULING | Age: 72
End: 2022-03-24

## 2022-03-24 DIAGNOSIS — Z12.31 VISIT FOR SCREENING MAMMOGRAM: Primary | ICD-10-CM

## 2022-04-01 ENCOUNTER — HOSPITAL ENCOUNTER (OUTPATIENT)
Dept: MAMMOGRAPHY | Age: 72
Discharge: HOME | End: 2022-04-01
Attending: FAMILY MEDICINE
Payer: MEDICARE

## 2022-04-01 DIAGNOSIS — Z12.31 VISIT FOR SCREENING MAMMOGRAM: ICD-10-CM

## 2022-04-01 PROCEDURE — 77063 BREAST TOMOSYNTHESIS BI: CPT

## 2022-05-19 ENCOUNTER — OFFICE VISIT (OUTPATIENT)
Dept: CARDIOLOGY CLINIC | Age: 72
End: 2022-05-19
Payer: MEDICARE

## 2022-05-19 VITALS
BODY MASS INDEX: 27.82 KG/M2 | WEIGHT: 138 LBS | HEIGHT: 59 IN | SYSTOLIC BLOOD PRESSURE: 136 MMHG | DIASTOLIC BLOOD PRESSURE: 58 MMHG | OXYGEN SATURATION: 100 % | HEART RATE: 56 BPM

## 2022-05-19 DIAGNOSIS — I47.1 SVT (SUPRAVENTRICULAR TACHYCARDIA) (HCC): Primary | ICD-10-CM

## 2022-05-19 DIAGNOSIS — E78.5 HYPERLIPIDEMIA, UNSPECIFIED HYPERLIPIDEMIA TYPE: ICD-10-CM

## 2022-05-19 DIAGNOSIS — I34.0 NON-RHEUMATIC MITRAL REGURGITATION: ICD-10-CM

## 2022-05-19 DIAGNOSIS — I10 ESSENTIAL HYPERTENSION: ICD-10-CM

## 2022-05-19 PROCEDURE — G8752 SYS BP LESS 140: HCPCS | Performed by: INTERNAL MEDICINE

## 2022-05-19 PROCEDURE — G8419 CALC BMI OUT NRM PARAM NOF/U: HCPCS | Performed by: INTERNAL MEDICINE

## 2022-05-19 PROCEDURE — 1101F PT FALLS ASSESS-DOCD LE1/YR: CPT | Performed by: INTERNAL MEDICINE

## 2022-05-19 PROCEDURE — 3017F COLORECTAL CA SCREEN DOC REV: CPT | Performed by: INTERNAL MEDICINE

## 2022-05-19 PROCEDURE — G8510 SCR DEP NEG, NO PLAN REQD: HCPCS | Performed by: INTERNAL MEDICINE

## 2022-05-19 PROCEDURE — 1090F PRES/ABSN URINE INCON ASSESS: CPT | Performed by: INTERNAL MEDICINE

## 2022-05-19 PROCEDURE — 99214 OFFICE O/P EST MOD 30 MIN: CPT | Performed by: INTERNAL MEDICINE

## 2022-05-19 PROCEDURE — G8754 DIAS BP LESS 90: HCPCS | Performed by: INTERNAL MEDICINE

## 2022-05-19 PROCEDURE — G8399 PT W/DXA RESULTS DOCUMENT: HCPCS | Performed by: INTERNAL MEDICINE

## 2022-05-19 PROCEDURE — G9899 SCRN MAM PERF RSLTS DOC: HCPCS | Performed by: INTERNAL MEDICINE

## 2022-05-19 PROCEDURE — G8536 NO DOC ELDER MAL SCRN: HCPCS | Performed by: INTERNAL MEDICINE

## 2022-05-19 PROCEDURE — G8427 DOCREV CUR MEDS BY ELIG CLIN: HCPCS | Performed by: INTERNAL MEDICINE

## 2022-05-19 RX ORDER — GABAPENTIN 300 MG/1
CAPSULE ORAL
COMMUNITY
Start: 2022-05-11

## 2022-05-19 NOTE — PROGRESS NOTES
1. Have you been to the ER, urgent care clinic since your last visit? Hospitalized since your last visit? No    2. Have you seen or consulted any other health care providers outside of the [address] [place] [address] since your last visit? Include any pap smears or colon screening.   Yes, Dr. BATOOL PELAEZ [facility]

## 2022-05-19 NOTE — PROGRESS NOTES
HISTORY OF PRESENT ILLNESS  John Nieto is a 71 y.o. female. 5/2019  Occasional episode of palpitation. Also feels lightheaded with near passing out when she is working in garden in the daytime. Happens on and off for past few years. No complete syncope. Follow-up    SVT    Cholesterol Problem    Hypertension  The history is provided by the patient. This is a chronic problem. The problem occurs constantly. Palpitations   The history is provided by the patient. This is a new problem. The problem has been gradually improving. The problem occurs every several days. The problem is associated with nothing. Her past medical history is significant for hypertension. Review of Systems   Constitutional: Negative for chills. HENT: Negative for nosebleeds. Eyes: Negative for blurred vision and double vision. Respiratory: Negative for wheezing. Cardiovascular: Positive for palpitations. Negative for leg swelling. Gastrointestinal: Negative for heartburn. Musculoskeletal: Negative for myalgias. Skin: Negative for rash. Endo/Heme/Allergies: Does not bruise/bleed easily. Family History   Problem Relation Age of Onset    Heart Attack Mother     Stroke Mother     Heart Attack Maternal Aunt     Stroke Maternal Aunt     Heart Attack Maternal Uncle     Stroke Maternal Uncle     Heart Attack Maternal Grandmother     Stroke Maternal Grandmother        Past Medical History:   Diagnosis Date    Chest pain 3/30/2018    ?  atypical ? angina-chest wall-gerd     Diabetes mellitus     Essential hypertension     Hyperlipidemia 3/30/2018    on statin    Palpitation 3/30/2018    frequent pac     Type 2 diabetes mellitus without complication, without long-term current use of insulin ([facility] [address] 3/30/2018    stable       Past Surgical History:   Procedure Laterality Date    HX HEENT  1950's    eye surgery on both eyes  crossed eye    HX HEENT      eye lid surgery       Social History     Tobacco Use  Smoking status: Never Smoker    Smokeless tobacco: Never Used   Substance Use Topics    Alcohol use: No       No Known Allergies    Prior to Admission medications    Medication Sig Start Date End Date Taking? Authorizing Provider   gabapentin (NEURONTIN) 300 mg capsule TAKE 1 CAPSULE BY MOUTH TWICE A DAY AS NEEDED 5/11/22  Yes Provider, Historical   latanoprost (XALATAN) 0.005 % ophthalmic solution INSTILL 1 DROP INTO EACH EYE ONCE DAILY AT BEDTIME 5/2/21  Yes Provider, Historical   doxazosin (Cardura) 2 mg tablet Take 2 mg by mouth daily. Yes Provider, Historical   lisinopril (PRINIVIL, ZESTRIL) 40 mg tablet Take 40 mg by mouth daily. Yes Provider, Historical   hydroCHLOROthiazide (HYDRODIURIL) 12.5 mg tablet Take 12.5 mg by mouth daily. Yes Provider, Historical   multivitamin (ONE A DAY) tablet Take 1 Tab by mouth daily. Yes Provider, Historical   aspirin delayed-release 81 mg tablet Take  by mouth daily. Yes Provider, Historical   omega 3-DHA-EPA-fish oil 1,000 mg (120 mg-180 mg) capsule Take 1 Cap by mouth daily. Yes Provider, Historical   metoprolol succinate (Toprol XL) 25 mg XL tablet Take 25 mg by mouth daily. Yes Provider, Historical   rosuvastatin (CRESTOR) 20 mg tablet Take 10 mg by mouth daily. Yes Provider, Historical         Visit Vitals  BP (!) 136/58 (BP 1 Location: Left upper arm, BP Patient Position: Sitting, BP Cuff Size: Adult)   Pulse (!) 56   Ht 4' 11\" (1.499 m)   Wt 62.6 kg (138 lb)   SpO2 100%   BMI 27.87 kg/m²         Physical Exam  Constitutional:       Appearance: She is well-developed. HENT:      Head: Normocephalic and atraumatic. Eyes:      Conjunctiva/sclera: Conjunctivae normal.   Neck:      Thyroid: No thyromegaly. Vascular: No JVD. Trachea: No tracheal deviation. Cardiovascular:      Rate and Rhythm: Normal rate and regular rhythm. Chest Wall: PMI is not displaced. Pulses: No decreased pulses. Heart sounds: No murmur heard.   No gallop. No S3 sounds. Pulmonary:      Effort: No respiratory distress. Breath sounds: No wheezing or rales. Chest:      Chest wall: No tenderness. Abdominal:      Palpations: Abdomen is soft. Tenderness: There is no abdominal tenderness. Musculoskeletal:      Cervical back: Neck supple. Skin:     General: Skin is warm. Neurological:      Mental Status: She is alert and oriented to person, place, and time. Ms. Nieto has a reminder for a \"due or due soon\" health maintenance. I have asked that she contact her primary care provider for follow-up on this health maintenance. I Have personally reviewed recent relevant labs available and discussed with patient  tsh-lipids    holter-2018  Rhythm is sinus. 2. IN and QRS are within normal limits. 3. 54 single VEs. 4. 4497 single SVEs, 40 paired, 17 runs of SVT (maximum 5 beats in duration). 5. Patient diary was submitted with 2 episodes of palpitations. SUMMARY:stress test-2018  Left ventricle: Systolic function was normal. Ejection fraction was  estimated in the range of 55 % to 60 %. There were no regional wall motion  abnormalities. Mitral valve: There was mild regurgitation. Tricuspid valve: There was mild regurgitation. NUCLEAR IMAGIN      Findings:   1. Stress images reveal normal Myoview distrubution in all the LV segments in short axis, vertical and horizontal long axis views. 2. Resting images have a normal uptake. 3. Gated images reveal normal wall motion and the ejection fraction is calculated to be 78%. Conclusion:   1. Normal perfusion scan. 2. Normal wall motion and ejection fraction. 3. Low risk scan. 10/2020  OT-frequent PAC. 1 4 beat VT. Short atrial run    Assessment         ICD-10-CM ICD-9-CM    1. SVT (supraventricular tachycardia) (HCC)  I47.1 427.89     Stable symptom continue treatment monitor   2. Non-rheumatic mitral regurgitation  I34.0 424.0     Stable monitor   3. Hyperlipidemia, unspecified hyperlipidemia type  E78.5 272.4     Continue treatment lab with PCP   4. Essential hypertension  I10 401.9     Stable monitor   5/2019  Episodes of dizziness on exertion. Could be orthostatic or vagal.  Last echo shows no pulmonary hypertension. If recurrent consider tilt table testing. Will change timing of lisinopril to evening and metoprolol to morning  9/2020  Symptom of palpitation and SVT. Check event monitor metoprolol was reduced recently due to resting bradycardia. 11/2020  MCOT reveals frequent PAC. No significant sustained arrhythmia. We will continue current dose of Toprol as symptoms are stable. Currently is not taking Cardura as blood pressure was low at home. She will resume if pressure remains high.  5/2021  Cardiac status stable. Continues to have PAC. Symptoms stable. Continue treatment. Elevated blood pressure back on Cardura 2 mg. Will increase back to 40 mg if needed. Now off Metformin and sugars are controlled  11/2021  Stable cardiac status. Continues to have PAC. Symptoms stable blood pressure controlled continue treatment back and neck pain likely related to arthritis  5/2022  Cardiac status stable. Blood pressure controlled continue treatment. Main issue is spinal and shoulder arthritis  Medications Discontinued During This Encounter   Medication Reason    cycloSPORINE (RESTASIS) 0.05 % ophthalmic emulsion Not A Current Medication    cyanocobalamin 1,000 mcg tablet Not A Current Medication       No orders of the defined types were placed in this encounter. Follow-up and Dispositions    · Return in about 6 months (around 11/19/2022).

## 2022-09-28 ENCOUNTER — TRANSCRIBE ORDER (OUTPATIENT)
Dept: SCHEDULING | Age: 72
End: 2022-09-28

## 2022-09-28 DIAGNOSIS — Z12.31 ENCOUNTER FOR SCREENING MAMMOGRAM FOR MALIGNANT NEOPLASM OF BREAST: Primary | ICD-10-CM

## 2022-11-30 ENCOUNTER — OFFICE VISIT (OUTPATIENT)
Dept: CARDIOLOGY CLINIC | Age: 72
End: 2022-11-30
Payer: MEDICARE

## 2022-11-30 VITALS
WEIGHT: 137 LBS | HEART RATE: 60 BPM | HEIGHT: 59 IN | BODY MASS INDEX: 27.62 KG/M2 | SYSTOLIC BLOOD PRESSURE: 150 MMHG | OXYGEN SATURATION: 98 % | DIASTOLIC BLOOD PRESSURE: 52 MMHG

## 2022-11-30 DIAGNOSIS — I47.1 SVT (SUPRAVENTRICULAR TACHYCARDIA) (HCC): Primary | ICD-10-CM

## 2022-11-30 DIAGNOSIS — I34.0 NON-RHEUMATIC MITRAL REGURGITATION: ICD-10-CM

## 2022-11-30 DIAGNOSIS — E78.5 HYPERLIPIDEMIA, UNSPECIFIED HYPERLIPIDEMIA TYPE: ICD-10-CM

## 2022-11-30 DIAGNOSIS — I10 ESSENTIAL HYPERTENSION: ICD-10-CM

## 2022-11-30 PROCEDURE — G8417 CALC BMI ABV UP PARAM F/U: HCPCS | Performed by: INTERNAL MEDICINE

## 2022-11-30 PROCEDURE — G9899 SCRN MAM PERF RSLTS DOC: HCPCS | Performed by: INTERNAL MEDICINE

## 2022-11-30 PROCEDURE — G8399 PT W/DXA RESULTS DOCUMENT: HCPCS | Performed by: INTERNAL MEDICINE

## 2022-11-30 PROCEDURE — G8536 NO DOC ELDER MAL SCRN: HCPCS | Performed by: INTERNAL MEDICINE

## 2022-11-30 PROCEDURE — 99214 OFFICE O/P EST MOD 30 MIN: CPT | Performed by: INTERNAL MEDICINE

## 2022-11-30 PROCEDURE — 1090F PRES/ABSN URINE INCON ASSESS: CPT | Performed by: INTERNAL MEDICINE

## 2022-11-30 PROCEDURE — G8427 DOCREV CUR MEDS BY ELIG CLIN: HCPCS | Performed by: INTERNAL MEDICINE

## 2022-11-30 PROCEDURE — G8753 SYS BP > OR = 140: HCPCS | Performed by: INTERNAL MEDICINE

## 2022-11-30 PROCEDURE — 3078F DIAST BP <80 MM HG: CPT | Performed by: INTERNAL MEDICINE

## 2022-11-30 PROCEDURE — 3017F COLORECTAL CA SCREEN DOC REV: CPT | Performed by: INTERNAL MEDICINE

## 2022-11-30 PROCEDURE — G8754 DIAS BP LESS 90: HCPCS | Performed by: INTERNAL MEDICINE

## 2022-11-30 PROCEDURE — G8432 DEP SCR NOT DOC, RNG: HCPCS | Performed by: INTERNAL MEDICINE

## 2022-11-30 PROCEDURE — 1123F ACP DISCUSS/DSCN MKR DOCD: CPT | Performed by: INTERNAL MEDICINE

## 2022-11-30 PROCEDURE — 1101F PT FALLS ASSESS-DOCD LE1/YR: CPT | Performed by: INTERNAL MEDICINE

## 2022-11-30 PROCEDURE — 3074F SYST BP LT 130 MM HG: CPT | Performed by: INTERNAL MEDICINE

## 2022-11-30 RX ORDER — CHOLECALCIFEROL TAB 125 MCG (5000 UNIT) 125 MCG
TAB ORAL
COMMUNITY

## 2022-11-30 RX ORDER — UREA 10 %
100 LOTION (ML) TOPICAL DAILY
COMMUNITY

## 2022-11-30 RX ORDER — VITAMIN E 268 MG
400 CAPSULE ORAL DAILY
COMMUNITY

## 2022-11-30 NOTE — PROGRESS NOTES
HISTORY OF PRESENT ILLNESS  Vineet Nieto is a 71 y.o. female. 5/2019  Occasional episode of palpitation. Also feels lightheaded with near passing out when she is working in garden in the daytime. Happens on and off for past few years. No complete syncope. Follow-up    SVT    Cholesterol Problem    Hypertension  The history is provided by the Patient. This is a chronic problem. The problem occurs constantly. Palpitations   The history is provided by the Patient. This is a new problem. The problem has been gradually improving. The problem occurs every several days. The problem is associated with nothing. Her past medical history is significant for hypertension. Review of Systems   Constitutional:  Negative for chills. HENT:  Negative for nosebleeds. Eyes:  Negative for blurred vision and double vision. Respiratory:  Negative for wheezing. Cardiovascular:  Positive for palpitations. Negative for leg swelling. Gastrointestinal:  Negative for heartburn. Musculoskeletal:  Negative for myalgias. Skin:  Negative for rash. Endo/Heme/Allergies:  Does not bruise/bleed easily. Family History   Problem Relation Age of Onset    Heart Attack Mother     Stroke Mother     Heart Attack Maternal Aunt     Stroke Maternal Aunt     Heart Attack Maternal Uncle     Stroke Maternal Uncle     Heart Attack Maternal Grandmother     Stroke Maternal Grandmother        Past Medical History:   Diagnosis Date    Chest pain 3/30/2018    ?  atypical ? angina-chest wall-gerd     Diabetes mellitus     Essential hypertension     Hyperlipidemia 3/30/2018    on statin    Palpitation 3/30/2018    frequent pac     Type 2 diabetes mellitus without complication, without long-term current use of insulin ([facility] [address] 3/30/2018    stable       Past Surgical History:   Procedure Laterality Date    HX HEENT  1950's    eye surgery on both eyes  crossed eye    HX HEENT      eye lid surgery       Social History     Tobacco Use    Smoking status: Never    Smokeless tobacco: Never   Substance Use Topics    Alcohol use: No       No Known Allergies    Prior to Admission medications    Medication Sig Start Date End Date Taking? Authorizing Provider   cyanocobalamin (Vitamin B-12) 100 mcg tablet Take 100 mcg by mouth daily. Yes Provider, Historical   cholecalciferol (VITAMIN D3) (5000 Units/125 mcg) tab tablet Take  by mouth. Yes Provider, Historical   vitamin E (AQUA GEMS) 268 mg (400 unit) capsule Take 400 Units by mouth daily. Yes Provider, Historical   gabapentin (NEURONTIN) 300 mg capsule TAKE 1 CAPSULE BY MOUTH TWICE A DAY AS NEEDED 5/11/22  Yes Provider, Historical   latanoprost (XALATAN) 0.005 % ophthalmic solution INSTILL 1 DROP INTO EACH EYE ONCE DAILY AT BEDTIME 5/2/21  Yes Provider, Historical   doxazosin (CARDURA) 2 mg tablet Take 2 mg by mouth daily. Yes Provider, Historical   lisinopril (PRINIVIL, ZESTRIL) 40 mg tablet Take 40 mg by mouth daily. Yes Provider, Historical   hydroCHLOROthiazide (HYDRODIURIL) 12.5 mg tablet Take 12.5 mg by mouth daily. Yes Provider, Historical   multivitamin (ONE A DAY) tablet Take 1 Tab by mouth daily. Yes Provider, Historical   aspirin delayed-release 81 mg tablet Take  by mouth daily. Yes Provider, Historical   omega 3-DHA-EPA-fish oil 1,000 mg (120 mg-180 mg) capsule Take 1 Cap by mouth daily. Yes Provider, Historical   metoprolol succinate (TOPROL-XL) 25 mg XL tablet Take 25 mg by mouth daily. Yes Provider, Historical   rosuvastatin (CRESTOR) 10 mg tablet Take 10 mg by mouth daily. Yes Provider, Historical         Visit Vitals  BP (!) 150/52 (BP 1 Location: Left upper arm, BP Patient Position: Sitting, BP Cuff Size: Small adult)   Pulse 60   Ht 4' 11\" (1.499 m)   Wt 62.1 kg (137 lb)   SpO2 98%   BMI 27.67 kg/m²         Physical Exam  Constitutional:       Appearance: She is well-developed. HENT:      Head: Normocephalic and atraumatic.    Eyes:      Conjunctiva/sclera: Conjunctivae normal.   Neck:      Thyroid: No thyromegaly. Vascular: No JVD. Trachea: No tracheal deviation. Cardiovascular:      Rate and Rhythm: Normal rate and regular rhythm. Chest Wall: PMI is not displaced. Pulses: No decreased pulses. Heart sounds: No murmur heard. No gallop. No S3 sounds. Pulmonary:      Effort: No respiratory distress. Breath sounds: No wheezing or rales. Chest:      Chest wall: No tenderness. Abdominal:      Palpations: Abdomen is soft. Tenderness: There is no abdominal tenderness. Musculoskeletal:      Cervical back: Neck supple. Skin:     General: Skin is warm. Neurological:      Mental Status: She is alert and oriented to person, place, and time. Ms. Nieto has a reminder for a \"due or due soon\" health maintenance. I have asked that she contact her primary care provider for follow-up on this health maintenance. I Have personally reviewed recent relevant labs available and discussed with patient  tsh-lipids    holter-2018  Rhythm is sinus. 2. MS and QRS are within normal limits. 3. 54 single VEs. 4. 4497 single SVEs, 40 paired, 17 runs of SVT (maximum 5 beats in duration). 5. Patient diary was submitted with 2 episodes of palpitations. SUMMARY:stress test-2018  Left ventricle: Systolic function was normal. Ejection fraction was  estimated in the range of 55 % to 60 %. There were no regional wall motion  abnormalities. Mitral valve: There was mild regurgitation. Tricuspid valve: There was mild regurgitation. NUCLEAR IMAGIN      Findings:   1. Stress images reveal normal Myoview distrubution in all the LV segments in short axis, vertical and horizontal long axis views. 2. Resting images have a normal uptake. 3. Gated images reveal normal wall motion and the ejection fraction is calculated to be 78%. Conclusion:   1. Normal perfusion scan. 2. Normal wall motion and ejection fraction. 3. Low risk scan. 10/2020  MCOT-frequent PAC. 1 4 beat VT. Short atrial run    Assessment         ICD-10-CM ICD-9-CM    1. SVT (supraventricular tachycardia) (HCC)  I47.1 427.89     Stable symptom monitor continue treatment      2. Non-rheumatic mitral regurgitation  I34.0 424.0     Stable monitor      3. Hyperlipidemia, unspecified hyperlipidemia type  E78.5 272.4     Continue treatment lab with PCP      4. Essential hypertension  I10 401.9     Stable continue current treatment      5/2019  Episodes of dizziness on exertion. Could be orthostatic or vagal.  Last echo shows no pulmonary hypertension. If recurrent consider tilt table testing. Will change timing of lisinopril to evening and metoprolol to morning  9/2020  Symptom of palpitation and SVT. Check event monitor metoprolol was reduced recently due to resting bradycardia. 11/2020  MCOT reveals frequent PAC. No significant sustained arrhythmia. We will continue current dose of Toprol as symptoms are stable. Currently is not taking Cardura as blood pressure was low at home. She will resume if pressure remains high.  5/2021  Cardiac status stable. Continues to have PAC. Symptoms stable. Continue treatment. Elevated blood pressure back on Cardura 2 mg. Will increase back to 40 mg if needed. Now off Metformin and sugars are controlled  11/2021  Stable cardiac status. Continues to have PAC. Symptoms stable blood pressure controlled continue treatment back and neck pain likely related to arthritis  5/2022  Cardiac status stable. Blood pressure controlled continue treatment. Main issue is spinal and shoulder arthritis  11/2022  Patient episode of syncope in setting of acute diverticulitis and rectal bleed. She was in emergency room notes reviewed. No recurrence since then that was only episode likely vagal.  Otherwise Cardiac status stable continue current medical management monitor. Mildly elevated blood pressure present.   Continue to monitor  There are no discontinued medications. No orders of the defined types were placed in this encounter. Follow-up and Dispositions    Return in about 6 months (around 5/30/2023).

## 2022-11-30 NOTE — PROGRESS NOTES
1. Have you been to the ER, urgent care clinic since your last visit? Hospitalized since your last visit? Yes Where: [facility]    2. Have you seen or consulted any other health care providers outside of the [address] [place] [address] since your last visit? Include any pap smears or colon screening.  Yes Where: PCP

## 2023-01-30 DIAGNOSIS — Z12.31 ENCOUNTER FOR SCREENING MAMMOGRAM FOR MALIGNANT NEOPLASM OF BREAST: Primary | ICD-10-CM

## 2023-01-31 DIAGNOSIS — Z12.31 ENCOUNTER FOR SCREENING MAMMOGRAM FOR MALIGNANT NEOPLASM OF BREAST: Primary | ICD-10-CM

## 2023-02-03 DIAGNOSIS — Z12.31 ENCOUNTER FOR SCREENING MAMMOGRAM FOR MALIGNANT NEOPLASM OF BREAST: Primary | ICD-10-CM

## 2023-02-04 DIAGNOSIS — Z12.31 ENCOUNTER FOR SCREENING MAMMOGRAM FOR MALIGNANT NEOPLASM OF BREAST: Primary | ICD-10-CM

## 2023-04-14 ENCOUNTER — HOSPITAL ENCOUNTER (OUTPATIENT)
Facility: HOSPITAL | Age: 73
Discharge: HOME | End: 2023-04-17
Payer: MEDICARE

## 2023-04-14 DIAGNOSIS — Z12.31 VISIT FOR SCREENING MAMMOGRAM: ICD-10-CM

## 2023-04-14 PROCEDURE — 77063 BREAST TOMOSYNTHESIS BI: CPT

## 2023-06-01 ENCOUNTER — OFFICE VISIT (OUTPATIENT)
Age: 73
End: 2023-06-01
Payer: MEDICARE

## 2023-06-01 VITALS
BODY MASS INDEX: 26.41 KG/M2 | WEIGHT: 131 LBS | DIASTOLIC BLOOD PRESSURE: 53 MMHG | HEIGHT: 59 IN | SYSTOLIC BLOOD PRESSURE: 137 MMHG | HEART RATE: 52 BPM | OXYGEN SATURATION: 99 %

## 2023-06-01 DIAGNOSIS — I10 ESSENTIAL (PRIMARY) HYPERTENSION: ICD-10-CM

## 2023-06-01 DIAGNOSIS — I34.0 NONRHEUMATIC MITRAL (VALVE) INSUFFICIENCY: ICD-10-CM

## 2023-06-01 DIAGNOSIS — I47.1 SUPRAVENTRICULAR TACHYCARDIA (HCC): Primary | ICD-10-CM

## 2023-06-01 DIAGNOSIS — E78.49 OTHER HYPERLIPIDEMIA: ICD-10-CM

## 2023-06-01 PROCEDURE — 3075F SYST BP GE 130 - 139MM HG: CPT | Performed by: INTERNAL MEDICINE

## 2023-06-01 PROCEDURE — 1123F ACP DISCUSS/DSCN MKR DOCD: CPT | Performed by: INTERNAL MEDICINE

## 2023-06-01 PROCEDURE — 3078F DIAST BP <80 MM HG: CPT | Performed by: INTERNAL MEDICINE

## 2023-06-01 PROCEDURE — 1090F PRES/ABSN URINE INCON ASSESS: CPT | Performed by: INTERNAL MEDICINE

## 2023-06-01 PROCEDURE — G8419 CALC BMI OUT NRM PARAM NOF/U: HCPCS | Performed by: INTERNAL MEDICINE

## 2023-06-01 PROCEDURE — 99214 OFFICE O/P EST MOD 30 MIN: CPT | Performed by: INTERNAL MEDICINE

## 2023-06-01 PROCEDURE — 3017F COLORECTAL CA SCREEN DOC REV: CPT | Performed by: INTERNAL MEDICINE

## 2023-06-01 PROCEDURE — G8427 DOCREV CUR MEDS BY ELIG CLIN: HCPCS | Performed by: INTERNAL MEDICINE

## 2023-06-01 PROCEDURE — G8399 PT W/DXA RESULTS DOCUMENT: HCPCS | Performed by: INTERNAL MEDICINE

## 2023-06-01 PROCEDURE — 1036F TOBACCO NON-USER: CPT | Performed by: INTERNAL MEDICINE

## 2023-06-01 RX ORDER — VITAMIN E 268 MG
CAPSULE ORAL
COMMUNITY
Start: 2022-11-10

## 2023-06-01 ASSESSMENT — PATIENT HEALTH QUESTIONNAIRE - PHQ9
SUM OF ALL RESPONSES TO PHQ QUESTIONS 1-9: 0
1. LITTLE INTEREST OR PLEASURE IN DOING THINGS: 0
SUM OF ALL RESPONSES TO PHQ9 QUESTIONS 1 & 2: 0
DEPRESSION UNABLE TO ASSESS: FUNCTIONAL CAPACITY MOTIVATION LIMITS ACCURACY
2. FEELING DOWN, DEPRESSED OR HOPELESS: 0

## 2023-06-01 NOTE — PROGRESS NOTES
HISTORY OF PRESENT ILLNESS  Antonetta Spurling Rash is a 71 y.o. female. 5/2019  Occasional episode of palpitation. Also feels lightheaded with near passing out when she is working in garden in the daytime. Happens on and off for past few years. No complete syncope. Follow-up    SVT    Cholesterol Problem    Hypertension  The history is provided by the Patient. This is a chronic problem. The problem occurs constantly. Palpitations   The history is provided by the Patient. This is a new problem. The problem has been gradually improving. The problem occurs every several days. The problem is associated with nothing. Her past medical history is significant for hypertension. Review of Systems   Constitutional:  Negative for chills. HENT:  Negative for nosebleeds. Eyes:  Negative for blurred vision and double vision. Respiratory:  Negative for wheezing. Cardiovascular:  Positive for palpitations. Negative for leg swelling. Gastrointestinal:  Negative for heartburn. Musculoskeletal:  Negative for myalgias. Skin:  Negative for rash. Endo/Heme/Allergies:  Does not bruise/bleed easily. Family History   Problem Relation Age of Onset    Heart Attack Mother     Stroke Mother     Heart Attack Maternal Aunt     Stroke Maternal Aunt     Heart Attack Maternal Uncle     Stroke Maternal Uncle     Heart Attack Maternal Grandmother     Stroke Maternal Grandmother        Past Medical History:   Diagnosis Date    Chest pain 3/30/2018    ?  atypical ? angina-chest wall-gerd     Diabetes mellitus ([address]     Essential hypertension     Hyperlipidemia 3/30/2018    on statin    Palpitation 3/30/2018    frequent pac     Type 2 diabetes mellitus without complication, without long-term current use of insulin ([address] 3/30/2018    stable       Past Surgical History:   Procedure Laterality Date    HEENT      eye lid surgery    HEENT  1950's    eye surgery on both eyes  crossed eye       Social History     Tobacco Use    Smoking

## 2023-06-01 NOTE — PROGRESS NOTES
1. Have you been to the ER, urgent care clinic since your last visit? Hospitalized since your last visit? No    2. Have you seen or consulted any other health care providers outside of the [address] [place] [address] since your last visit? Include any pap smears or colon screening.       No

## 2023-12-01 ENCOUNTER — OFFICE VISIT (OUTPATIENT)
Age: 73
End: 2023-12-01
Payer: MEDICARE

## 2023-12-01 VITALS
WEIGHT: 131.03 LBS | BODY MASS INDEX: 26.41 KG/M2 | HEART RATE: 70 BPM | SYSTOLIC BLOOD PRESSURE: 177 MMHG | HEIGHT: 59 IN | OXYGEN SATURATION: 100 % | DIASTOLIC BLOOD PRESSURE: 72 MMHG

## 2023-12-01 DIAGNOSIS — I47.10 SUPRAVENTRICULAR TACHYCARDIA: Primary | ICD-10-CM

## 2023-12-01 DIAGNOSIS — I34.0 NONRHEUMATIC MITRAL (VALVE) INSUFFICIENCY: ICD-10-CM

## 2023-12-01 DIAGNOSIS — I10 ESSENTIAL (PRIMARY) HYPERTENSION: ICD-10-CM

## 2023-12-01 DIAGNOSIS — E78.49 OTHER HYPERLIPIDEMIA: ICD-10-CM

## 2023-12-01 PROCEDURE — G8484 FLU IMMUNIZE NO ADMIN: HCPCS | Performed by: INTERNAL MEDICINE

## 2023-12-01 PROCEDURE — G8427 DOCREV CUR MEDS BY ELIG CLIN: HCPCS | Performed by: INTERNAL MEDICINE

## 2023-12-01 PROCEDURE — 3017F COLORECTAL CA SCREEN DOC REV: CPT | Performed by: INTERNAL MEDICINE

## 2023-12-01 PROCEDURE — 1036F TOBACCO NON-USER: CPT | Performed by: INTERNAL MEDICINE

## 2023-12-01 PROCEDURE — 3078F DIAST BP <80 MM HG: CPT | Performed by: INTERNAL MEDICINE

## 2023-12-01 PROCEDURE — 1090F PRES/ABSN URINE INCON ASSESS: CPT | Performed by: INTERNAL MEDICINE

## 2023-12-01 PROCEDURE — G8419 CALC BMI OUT NRM PARAM NOF/U: HCPCS | Performed by: INTERNAL MEDICINE

## 2023-12-01 PROCEDURE — G8399 PT W/DXA RESULTS DOCUMENT: HCPCS | Performed by: INTERNAL MEDICINE

## 2023-12-01 PROCEDURE — 99214 OFFICE O/P EST MOD 30 MIN: CPT | Performed by: INTERNAL MEDICINE

## 2023-12-01 PROCEDURE — 3077F SYST BP >= 140 MM HG: CPT | Performed by: INTERNAL MEDICINE

## 2023-12-01 PROCEDURE — 1123F ACP DISCUSS/DSCN MKR DOCD: CPT | Performed by: INTERNAL MEDICINE

## 2023-12-01 RX ORDER — METOPROLOL SUCCINATE 25 MG/1
25 TABLET, EXTENDED RELEASE ORAL DAILY
COMMUNITY

## 2023-12-01 NOTE — PROGRESS NOTES
1. Have you been to the ER, urgent care clinic since your last visit? Hospitalized since your last visit? No    2. Have you seen or consulted any other health care providers outside of the [address] [place] [address] since your last visit? Include any pap smears or colon screening.       Yes--PCP routine follow up

## 2023-12-01 NOTE — PROGRESS NOTES
HISTORY OF PRESENT ILLNESS  Zev Mills is a 71 y.o. female. 5/2019  Occasional episode of palpitation. Also feels lightheaded with near passing out when she is working in garden in the daytime. Happens on and off for past few years. No complete syncope. Follow-up    SVT    Cholesterol Problem    Hypertension  The history is provided by the Patient. This is a chronic problem. The problem occurs constantly. Palpitations   The history is provided by the Patient. This is a new problem. The problem has been gradually improving. The problem occurs every several days. The problem is associated with nothing. Her past medical history is significant for hypertension. Review of Systems   Constitutional:  Negative for chills. HENT:  Negative for nosebleeds. Eyes:  Negative for blurred vision and double vision. Respiratory:  Negative for wheezing. Cardiovascular:  Positive for palpitations. Negative for leg swelling. Gastrointestinal:  Negative for heartburn. Musculoskeletal:  Negative for myalgias. Skin:  Negative for rash. Endo/Heme/Allergies:  Does not bruise/bleed easily. Family History   Problem Relation Age of Onset    Heart Attack Mother     Stroke Mother     Heart Attack Maternal Aunt     Stroke Maternal Aunt     Heart Attack Maternal Uncle     Stroke Maternal Uncle     Heart Attack Maternal Grandmother     Stroke Maternal Grandmother        Past Medical History:   Diagnosis Date    Chest pain 3/30/2018    ?  atypical ? angina-chest wall-gerd     Diabetes mellitus ([address])     Essential hypertension     Hyperlipidemia 3/30/2018    on statin    Palpitation 3/30/2018    frequent pac     Type 2 diabetes mellitus without complication, without long-term current use of insulin ([address]) 3/30/2018    stable       Past Surgical History:   Procedure Laterality Date    HEENT      eye lid surgery    HEENT  1950's    eye surgery on both eyes  crossed eye       Social History     Tobacco Use    Smoking

## 2024-02-16 ENCOUNTER — HOSPITAL ENCOUNTER (OUTPATIENT)
Facility: HOSPITAL | Age: 74
End: 2024-02-16
Attending: FAMILY MEDICINE
Payer: MEDICARE

## 2024-02-16 DIAGNOSIS — Z78.0 POSTMENOPAUSAL: ICD-10-CM

## 2024-02-16 PROCEDURE — 77080 DXA BONE DENSITY AXIAL: CPT

## 2024-03-13 ENCOUNTER — HOSPITAL ENCOUNTER (OUTPATIENT)
Facility: HOSPITAL | Age: 74
Discharge: HOME | End: 2024-03-16
Payer: MEDICARE

## 2024-03-13 LAB
ALBUMIN SERPL-MCNC: 3.6 G/DL (ref 3.4–5)
ANION GAP SERPL CALC-SCNC: 5 MMOL/L (ref 3–18)
APPEARANCE UR: CLEAR
BACTERIA URNS QL MICRO: NEGATIVE /HPF
BILIRUB UR QL: NEGATIVE
BUN SERPL-MCNC: 27 MG/DL (ref 7–18)
BUN/CREAT SERPL: 24 (ref 12–20)
CALCIUM SERPL-MCNC: 10.2 MG/DL (ref 8.5–10.1)
CALCIUM SERPL-MCNC: 9.9 MG/DL (ref 8.5–10.1)
CHLORIDE SERPL-SCNC: 104 MMOL/L (ref 100–111)
CO2 SERPL-SCNC: 34 MMOL/L (ref 21–32)
COLOR UR: YELLOW
CREAT SERPL-MCNC: 1.12 MG/DL (ref 0.6–1.3)
CREAT UR-MCNC: 52 MG/DL (ref 30–125)
CREAT UR-MCNC: 53 MG/DL (ref 30–125)
EPITH CASTS URNS QL MICRO: NORMAL /LPF (ref 0–5)
ERYTHROCYTE [DISTWIDTH] IN BLOOD BY AUTOMATED COUNT: 13.7 % (ref 11.6–14.5)
ERYTHROCYTE [SEDIMENTATION RATE] IN BLOOD: 25 MM/HR (ref 0–30)
GLUCOSE SERPL-MCNC: 138 MG/DL (ref 74–99)
GLUCOSE UR STRIP.AUTO-MCNC: NEGATIVE MG/DL
HCT VFR BLD AUTO: 43.9 % (ref 35–45)
HGB BLD-MCNC: 14.6 G/DL (ref 12–16)
HGB UR QL STRIP: ABNORMAL
KETONES UR QL STRIP.AUTO: NEGATIVE MG/DL
LEUKOCYTE ESTERASE UR QL STRIP.AUTO: NEGATIVE
MCH RBC QN AUTO: 28.2 PG (ref 24–34)
MCHC RBC AUTO-ENTMCNC: 33.3 G/DL (ref 31–37)
MCV RBC AUTO: 84.7 FL (ref 78–100)
MICROALBUMIN UR-MCNC: 9.69 MG/DL (ref 0–3)
MICROALBUMIN/CREAT UR-RTO: 186 MG/G (ref 0–30)
NITRITE UR QL STRIP.AUTO: NEGATIVE
NRBC # BLD: 0 K/UL (ref 0–0.01)
NRBC BLD-RTO: 0 PER 100 WBC
PH UR STRIP: 5.5 (ref 5–8)
PHOSPHATE SERPL-MCNC: 3.2 MG/DL (ref 2.5–4.9)
PLATELET # BLD AUTO: 230 K/UL (ref 135–420)
PMV BLD AUTO: 10.2 FL (ref 9.2–11.8)
POTASSIUM SERPL-SCNC: 4.5 MMOL/L (ref 3.5–5.5)
PROT UR STRIP-MCNC: NEGATIVE MG/DL
PROT UR-MCNC: 17 MG/DL
PTH-INTACT SERPL-MCNC: 48.5 PG/ML (ref 18.4–88)
RBC # BLD AUTO: 5.18 M/UL (ref 4.2–5.3)
RBC #/AREA URNS HPF: NORMAL /HPF (ref 0–5)
SODIUM SERPL-SCNC: 143 MMOL/L (ref 136–145)
SP GR UR REFRACTOMETRY: 1.01 (ref 1–1.03)
URATE SERPL-MCNC: 7.3 MG/DL (ref 2.6–7.2)
UROBILINOGEN UR QL STRIP.AUTO: 0.2 EU/DL (ref 0.2–1)
WBC # BLD AUTO: 4.5 K/UL (ref 4.6–13.2)
WBC URNS QL MICRO: NEGATIVE /HPF (ref 0–4)

## 2024-03-13 PROCEDURE — 86037 ANCA TITER EACH ANTIBODY: CPT

## 2024-03-13 PROCEDURE — 83516 IMMUNOASSAY NONANTIBODY: CPT

## 2024-03-13 PROCEDURE — 80069 RENAL FUNCTION PANEL: CPT

## 2024-03-13 PROCEDURE — 84165 PROTEIN E-PHORESIS SERUM: CPT

## 2024-03-13 PROCEDURE — 86235 NUCLEAR ANTIGEN ANTIBODY: CPT

## 2024-03-13 PROCEDURE — 83970 ASSAY OF PARATHORMONE: CPT

## 2024-03-13 PROCEDURE — 36415 COLL VENOUS BLD VENIPUNCTURE: CPT

## 2024-03-13 PROCEDURE — 86160 COMPLEMENT ANTIGEN: CPT

## 2024-03-13 PROCEDURE — 86225 DNA ANTIBODY NATIVE: CPT

## 2024-03-13 PROCEDURE — 81001 URINALYSIS AUTO W/SCOPE: CPT

## 2024-03-13 PROCEDURE — 83521 IG LIGHT CHAINS FREE EACH: CPT

## 2024-03-13 PROCEDURE — 84156 ASSAY OF PROTEIN URINE: CPT

## 2024-03-13 PROCEDURE — 84550 ASSAY OF BLOOD/URIC ACID: CPT

## 2024-03-13 PROCEDURE — 82043 UR ALBUMIN QUANTITATIVE: CPT

## 2024-03-13 PROCEDURE — 85652 RBC SED RATE AUTOMATED: CPT

## 2024-03-13 PROCEDURE — 85027 COMPLETE CBC AUTOMATED: CPT

## 2024-03-13 PROCEDURE — 82570 ASSAY OF URINE CREATININE: CPT

## 2024-03-14 LAB
CENTROMERE B AB SER-ACNC: <0.2 AI (ref 0–0.9)
CHROMATIN AB SERPL-ACNC: <0.2 AI (ref 0–0.9)
DSDNA AB SER-ACNC: <1 IU/ML (ref 0–9)
ENA JO1 AB SER-ACNC: <0.2 AI (ref 0–0.9)
ENA RNP AB SER-ACNC: 0.6 AI (ref 0–0.9)
ENA SCL70 AB SER-ACNC: <0.2 AI (ref 0–0.9)
ENA SM AB SER-ACNC: <0.2 AI (ref 0–0.9)
ENA SS-A AB SER-ACNC: <0.2 AI (ref 0–0.9)
ENA SS-B AB SER-ACNC: <0.2 AI (ref 0–0.9)
KAPPA LC FREE SER-MCNC: 38 MG/L (ref 3.3–19.4)
KAPPA LC FREE/LAMBDA FREE SER: 1.58 (ref 0.26–1.65)
LAMBDA LC FREE SERPL-MCNC: 24 MG/L (ref 5.7–26.3)
Lab: NORMAL

## 2024-03-15 LAB
ALBUMIN SERPL ELPH-MCNC: 3.6 G/DL (ref 2.9–4.4)
ALBUMIN/GLOB SERPL: 1 (ref 0.7–1.7)
ALPHA1 GLOB SERPL ELPH-MCNC: 0.3 G/DL (ref 0–0.4)
ALPHA2 GLOB SERPL ELPH-MCNC: 1.1 G/DL (ref 0.4–1)
B-GLOBULIN SERPL ELPH-MCNC: 1.1 G/DL (ref 0.7–1.3)
C-ANCA TITR SER IF: NORMAL TITER
C3 SERPL-MCNC: 168 MG/DL (ref 82–167)
C4 SERPL-MCNC: 43 MG/DL (ref 12–38)
GAMMA GLOB SERPL ELPH-MCNC: 1 G/DL (ref 0.4–1.8)
GLOBULIN SER CALC-MCNC: 3.5 G/DL (ref 2.2–3.9)
M PROTEIN SERPL ELPH-MCNC: ABNORMAL G/DL
MYELOPEROXIDASE AB SER IA-ACNC: <0.2 UNITS (ref 0–0.9)
P-ANCA ATYPICAL TITR SER IF: NORMAL TITER
P-ANCA TITR SER IF: NORMAL TITER
PROT SERPL-MCNC: 7.1 G/DL (ref 6–8.5)
PROTEINASE3 AB SER IA-ACNC: <0.2 UNITS (ref 0–0.9)

## 2024-05-02 ENCOUNTER — HOSPITAL ENCOUNTER (OUTPATIENT)
Facility: HOSPITAL | Age: 74
Discharge: HOME | End: 2024-05-02
Attending: INTERNAL MEDICINE
Payer: MEDICARE

## 2024-05-02 DIAGNOSIS — R31.9 HEMATURIA, UNSPECIFIED TYPE: ICD-10-CM

## 2024-05-02 PROCEDURE — 74176 CT ABD & PELVIS W/O CONTRAST: CPT

## 2024-05-10 ENCOUNTER — HOSPITAL ENCOUNTER (OUTPATIENT)
Facility: HOSPITAL | Age: 74
End: 2024-05-10
Payer: MEDICARE

## 2024-05-10 LAB
ALBUMIN SERPL-MCNC: 3.1 G/DL (ref 3.4–5)
ANION GAP SERPL CALC-SCNC: 4 MMOL/L (ref 3–18)
APPEARANCE UR: CLEAR
BACTERIA URNS QL MICRO: ABNORMAL /HPF
BILIRUB UR QL: NEGATIVE
BUN SERPL-MCNC: 21 MG/DL (ref 7–18)
BUN/CREAT SERPL: 20 (ref 12–20)
CALCIUM SERPL-MCNC: 9.4 MG/DL (ref 8.5–10.1)
CALCIUM SERPL-MCNC: 9.7 MG/DL (ref 8.5–10.1)
CHLORIDE SERPL-SCNC: 102 MMOL/L (ref 100–111)
CO2 SERPL-SCNC: 32 MMOL/L (ref 21–32)
COLOR UR: YELLOW
CREAT SERPL-MCNC: 1.03 MG/DL (ref 0.6–1.3)
CREAT UR-MCNC: 31 MG/DL (ref 30–125)
EPITH CASTS URNS QL MICRO: ABNORMAL /LPF (ref 0–5)
ERYTHROCYTE [DISTWIDTH] IN BLOOD BY AUTOMATED COUNT: 13.5 % (ref 11.6–14.5)
GLUCOSE SERPL-MCNC: 133 MG/DL (ref 74–99)
GLUCOSE UR STRIP.AUTO-MCNC: NEGATIVE MG/DL
HCT VFR BLD AUTO: 41.4 % (ref 35–45)
HGB BLD-MCNC: 13.7 G/DL (ref 12–16)
HGB UR QL STRIP: ABNORMAL
KETONES UR QL STRIP.AUTO: NEGATIVE MG/DL
LEUKOCYTE ESTERASE UR QL STRIP.AUTO: NEGATIVE
MCH RBC QN AUTO: 28.6 PG (ref 24–34)
MCHC RBC AUTO-ENTMCNC: 33.1 G/DL (ref 31–37)
MCV RBC AUTO: 86.4 FL (ref 78–100)
MICROALBUMIN UR-MCNC: 5.37 MG/DL (ref 0–3)
MICROALBUMIN/CREAT UR-RTO: 173 MG/G (ref 0–30)
NITRITE UR QL STRIP.AUTO: NEGATIVE
NRBC # BLD: 0 K/UL (ref 0–0.01)
NRBC BLD-RTO: 0 PER 100 WBC
PH UR STRIP: 5.5 (ref 5–8)
PHOSPHATE SERPL-MCNC: 3.3 MG/DL (ref 2.5–4.9)
PLATELET # BLD AUTO: 251 K/UL (ref 135–420)
PMV BLD AUTO: 9.8 FL (ref 9.2–11.8)
POTASSIUM SERPL-SCNC: 4.3 MMOL/L (ref 3.5–5.5)
PROT UR STRIP-MCNC: NEGATIVE MG/DL
PROT UR-MCNC: 9 MG/DL
PTH-INTACT SERPL-MCNC: 50.1 PG/ML (ref 18.4–88)
RBC # BLD AUTO: 4.79 M/UL (ref 4.2–5.3)
RBC #/AREA URNS HPF: ABNORMAL /HPF (ref 0–5)
SODIUM SERPL-SCNC: 138 MMOL/L (ref 136–145)
SP GR UR REFRACTOMETRY: 1 (ref 1–1.03)
UROBILINOGEN UR QL STRIP.AUTO: 0.2 EU/DL (ref 0.2–1)
WBC # BLD AUTO: 6.7 K/UL (ref 4.6–13.2)
WBC URNS QL MICRO: ABNORMAL /HPF (ref 0–4)

## 2024-05-10 PROCEDURE — 80069 RENAL FUNCTION PANEL: CPT

## 2024-05-10 PROCEDURE — 85027 COMPLETE CBC AUTOMATED: CPT

## 2024-05-10 PROCEDURE — 36415 COLL VENOUS BLD VENIPUNCTURE: CPT

## 2024-05-10 PROCEDURE — 83970 ASSAY OF PARATHORMONE: CPT

## 2024-05-10 PROCEDURE — 82570 ASSAY OF URINE CREATININE: CPT

## 2024-05-10 PROCEDURE — 81001 URINALYSIS AUTO W/SCOPE: CPT

## 2024-05-10 PROCEDURE — 82043 UR ALBUMIN QUANTITATIVE: CPT

## 2024-05-10 PROCEDURE — 84156 ASSAY OF PROTEIN URINE: CPT

## 2024-05-23 ENCOUNTER — CLINICAL DOCUMENTATION (OUTPATIENT)
Facility: HOSPITAL | Age: 74
End: 2024-05-23

## 2024-06-14 ENCOUNTER — HOSPITAL ENCOUNTER (OUTPATIENT)
Facility: HOSPITAL | Age: 74
End: 2024-06-14
Payer: MEDICARE

## 2024-06-14 VITALS — HEIGHT: 59 IN | WEIGHT: 129 LBS | BODY MASS INDEX: 26 KG/M2

## 2024-06-14 DIAGNOSIS — Z12.31 ENCOUNTER FOR SCREENING MAMMOGRAM FOR MALIGNANT NEOPLASM OF BREAST: ICD-10-CM

## 2024-06-14 PROCEDURE — 77063 BREAST TOMOSYNTHESIS BI: CPT

## 2024-06-24 NOTE — PROGRESS NOTES
tablet, 1 oral Daily) Active.  rosuvastatin  (10mg tablet, 1 oral Daily) Active.  hydroCHLOROthiazide  (12.5mg capsule, 1 oral Daily) Active.  Cardura  (2mg tablet, 1 oral DAILY AS NEEDED) Active.  multivitamin  (1 oral Daily) Active.  ZyrTEC  (10mg tablet, 1 oral Daily) Active.  Medications Reconciled     Health Maintenance History (Divina Guerrero; 5/23/2024 2:12 PM)  Colonoscopy, Screening   [02/2023]:  Mammogram, Screening   [05/2023]:    Other Problems (Divina Guerrero; 5/23/2024 2:08 PM)  Portal Access Education (Z71.9)          Review of Systems (Dean Zuñiga MD; 6/24/2024 3:32 AM)  General Not Present- Anorexia, Chills, Fatigue and Fever.  Skin Not Present- Bruising, Pruritus, Rash and Ulcer.  HEENT Not Present- Dry Mucous Membranes, Dysgeusia, Oral Ulcers, Periorbital Puffiness and Sore Throat.  Respiratory Not Present- Cough, Difficulty Breathing on Exertion, Dyspnea and Hemoptysis.  Cardiovascular Not Present- Chest Pain, Claudications, Orthopnea, Palpitations, Paroxysmal Nocturnal Dyspnea and Swelling of Extremities.  Gastrointestinal Not Present- Abdominal Pain, Abdominal Swelling, Constipation, Diarrhea, Hematochezia, Melena, Nausea and Vomiting.  Female Genitourinary Not Present- Blood in Urine, Difficulty Emptying Bladder, Dysuria, Frequency, Hematuria and Nocturia.  Musculoskeletal Not Present- Joint Pain, Joint Redness, Joint Stiffness, Joint Swelling, Leg Cramps and Myalgia.  Neurological Not Present- Dizziness, Headaches, Syncope and Trouble walking.  Endocrine Not Present- Appetite Changes, Excessive Thirst, Polydipsia and Polyuria.  Hematology Not Present- Easy Bruising and Excessive bleeding.    Vitals (Divina Guerrero; 5/23/2024 2:13 PM)  5/23/2024 2:12 PM  Weight: 132 lb   Height: 59 in   Height was reported by patient.  Body Surface Area: 1.55 m²   Body Mass Index: 26.66 kg/m²    Pain Level: 0/10    Temp.: 97.3° F    Pulse: 61 (Regular)    Resp.: 16 (Unlabored)    P.OX: 100% (Room air, FiO2: 21%)  BP:

## 2025-01-10 ENCOUNTER — OFFICE VISIT (OUTPATIENT)
Age: 75
End: 2025-01-10

## 2025-01-10 VITALS
WEIGHT: 127 LBS | OXYGEN SATURATION: 98 % | DIASTOLIC BLOOD PRESSURE: 80 MMHG | HEIGHT: 59 IN | SYSTOLIC BLOOD PRESSURE: 150 MMHG | BODY MASS INDEX: 25.6 KG/M2 | HEART RATE: 66 BPM

## 2025-01-10 DIAGNOSIS — I47.10 SVT (SUPRAVENTRICULAR TACHYCARDIA) (HCC): Primary | ICD-10-CM

## 2025-01-10 DIAGNOSIS — I10 ESSENTIAL (PRIMARY) HYPERTENSION: ICD-10-CM

## 2025-01-10 RX ORDER — DOXAZOSIN 2 MG/1
2 TABLET ORAL DAILY
COMMUNITY

## 2025-01-10 NOTE — PROGRESS NOTES
facility-administered medications for this visit.       Past Surgical History:   Procedure Laterality Date    BREAST BIOPSY      HEENT      eye lid surgery    HEENT  1950's    eye surgery on both eyes  crossed eye       Allergies and Sensitivities:  No Known Allergies    Family History:  Family History   Problem Relation Age of Onset    Heart Attack Mother     Stroke Mother     Heart Attack Maternal Aunt     Stroke Maternal Aunt     Heart Attack Maternal Uncle     Stroke Maternal Uncle     Heart Attack Maternal Grandmother     Stroke Maternal Grandmother        Social History:  Social History     Tobacco Use    Smoking status: Never    Smokeless tobacco: Never   Substance Use Topics    Alcohol use: No    Drug use: Never     She  reports that she has never smoked. She has never used smokeless tobacco.  She  reports no history of alcohol use.    Physical Exam:  BP Readings from Last 3 Encounters:   01/10/25 (!) 150/80   12/01/23 (!) 177/72   06/01/23 (!) 137/53         Pulse Readings from Last 3 Encounters:   01/10/25 66   12/01/23 70   06/01/23 52          Wt Readings from Last 3 Encounters:   01/10/25 57.6 kg (127 lb)   06/14/24 58.5 kg (129 lb)   12/01/23 59.4 kg (131 lb 0.4 oz)       Constitutional: Oriented to person, place, and time.   HENT: Head: Normocephalic and atraumatic. Eyes: Conjunctivae and extraocular motions are normal.   Neck: No JVD present. Carotid bruit is not appreciated.   Cardiovascular: Regular rhythm.   No murmur, gallop or rubs appreciated  Lung: Breath sounds normal. No respiratory distress. No ronchi or rales appreciated  Abdominal: No tenderness. No rebound and no guarding.   Musculoskeletal: There is no lower extremity edema. No cynosis  Lymphadenopathy:  No cervical or supraclavicular adenopathy appriciated.   Neurological: No gross motor deficit noted.  Skin: No visible skin rash noted.   No Ear discharge noted  Psychiatric: Normal mood and affect.     LABS:   @  Lab Results

## 2025-01-21 ENCOUNTER — HOSPITAL ENCOUNTER (OUTPATIENT)
Facility: HOSPITAL | Age: 75
Discharge: HOME | End: 2025-01-24
Payer: MEDICARE

## 2025-01-21 LAB
25(OH)D3 SERPL-MCNC: 80.8 NG/ML (ref 30–100)
ALBUMIN SERPL-MCNC: 3.4 G/DL (ref 3.4–5)
ANION GAP SERPL CALC-SCNC: 2 MMOL/L (ref 3–18)
APPEARANCE UR: CLEAR
BACTERIA URNS QL MICRO: NEGATIVE /HPF
BILIRUB UR QL: NEGATIVE
BUN SERPL-MCNC: 23 MG/DL (ref 7–18)
BUN/CREAT SERPL: 22 (ref 12–20)
CALCIUM SERPL-MCNC: 9.6 MG/DL (ref 8.5–10.1)
CHLORIDE SERPL-SCNC: 104 MMOL/L (ref 100–111)
CO2 SERPL-SCNC: 34 MMOL/L (ref 21–32)
COLOR UR: YELLOW
CREAT SERPL-MCNC: 1.03 MG/DL (ref 0.6–1.3)
CREAT UR-MCNC: 46 MG/DL (ref 30–125)
CREAT UR-MCNC: 47 MG/DL (ref 30–125)
EPITH CASTS URNS QL MICRO: NEGATIVE /LPF (ref 0–5)
ERYTHROCYTE [DISTWIDTH] IN BLOOD BY AUTOMATED COUNT: 13.2 % (ref 11.6–14.5)
GLUCOSE SERPL-MCNC: 151 MG/DL (ref 74–99)
GLUCOSE UR STRIP.AUTO-MCNC: NEGATIVE MG/DL
HCT VFR BLD AUTO: 43.8 % (ref 35–45)
HGB BLD-MCNC: 14.4 G/DL (ref 12–16)
HGB UR QL STRIP: ABNORMAL
KETONES UR QL STRIP.AUTO: NEGATIVE MG/DL
LEUKOCYTE ESTERASE UR QL STRIP.AUTO: NEGATIVE
MCH RBC QN AUTO: 29.2 PG (ref 24–34)
MCHC RBC AUTO-ENTMCNC: 32.9 G/DL (ref 31–37)
MCV RBC AUTO: 88.8 FL (ref 78–100)
MICROALBUMIN UR-MCNC: 5.72 MG/DL (ref 0–3)
MICROALBUMIN/CREAT UR-RTO: 122 MG/G (ref 0–30)
NITRITE UR QL STRIP.AUTO: NEGATIVE
NRBC # BLD: 0 K/UL (ref 0–0.01)
NRBC BLD-RTO: 0 PER 100 WBC
PH UR STRIP: 5 (ref 5–8)
PHOSPHATE SERPL-MCNC: 3.5 MG/DL (ref 2.5–4.9)
PLATELET # BLD AUTO: 245 K/UL (ref 135–420)
PMV BLD AUTO: 10.3 FL (ref 9.2–11.8)
POTASSIUM SERPL-SCNC: 4.5 MMOL/L (ref 3.5–5.5)
PROT UR STRIP-MCNC: NEGATIVE MG/DL
PROT UR-MCNC: <5 MG/DL
RBC # BLD AUTO: 4.93 M/UL (ref 4.2–5.3)
RBC #/AREA URNS HPF: ABNORMAL /HPF (ref 0–5)
SODIUM SERPL-SCNC: 140 MMOL/L (ref 136–145)
SP GR UR REFRACTOMETRY: 1.01 (ref 1–1.03)
UROBILINOGEN UR QL STRIP.AUTO: 0.2 EU/DL (ref 0.2–1)
WBC # BLD AUTO: 6.6 K/UL (ref 4.6–13.2)
WBC URNS QL MICRO: NEGATIVE /HPF (ref 0–4)

## 2025-01-21 PROCEDURE — 84156 ASSAY OF PROTEIN URINE: CPT

## 2025-01-21 PROCEDURE — 82043 UR ALBUMIN QUANTITATIVE: CPT

## 2025-01-21 PROCEDURE — 82306 VITAMIN D 25 HYDROXY: CPT

## 2025-01-21 PROCEDURE — 80069 RENAL FUNCTION PANEL: CPT

## 2025-01-21 PROCEDURE — 85027 COMPLETE CBC AUTOMATED: CPT

## 2025-01-21 PROCEDURE — 36415 COLL VENOUS BLD VENIPUNCTURE: CPT

## 2025-01-21 PROCEDURE — 82570 ASSAY OF URINE CREATININE: CPT

## 2025-01-21 PROCEDURE — 81001 URINALYSIS AUTO W/SCOPE: CPT

## 2025-03-04 ENCOUNTER — HOSPITAL ENCOUNTER (OUTPATIENT)
Facility: HOSPITAL | Age: 75
Discharge: HOME | End: 2025-03-07
Payer: MEDICARE

## 2025-03-04 LAB
ALBUMIN SERPL-MCNC: 3.6 G/DL (ref 3.4–5)
ANION GAP SERPL CALC-SCNC: 4 MMOL/L (ref 3–18)
BUN SERPL-MCNC: 23 MG/DL (ref 7–18)
BUN/CREAT SERPL: 22 (ref 12–20)
CALCIUM SERPL-MCNC: 9.5 MG/DL (ref 8.5–10.1)
CHLORIDE SERPL-SCNC: 104 MMOL/L (ref 100–111)
CO2 SERPL-SCNC: 30 MMOL/L (ref 21–32)
CREAT SERPL-MCNC: 1.05 MG/DL (ref 0.6–1.3)
GLUCOSE SERPL-MCNC: 103 MG/DL (ref 74–99)
PHOSPHATE SERPL-MCNC: 3.9 MG/DL (ref 2.5–4.9)
POTASSIUM SERPL-SCNC: 4.5 MMOL/L (ref 3.5–5.5)
SODIUM SERPL-SCNC: 138 MMOL/L (ref 136–145)

## 2025-03-04 PROCEDURE — 36415 COLL VENOUS BLD VENIPUNCTURE: CPT

## 2025-03-04 PROCEDURE — 80069 RENAL FUNCTION PANEL: CPT

## 2025-04-01 ENCOUNTER — HOSPITAL ENCOUNTER (OUTPATIENT)
Facility: HOSPITAL | Age: 75
Discharge: HOME | End: 2025-04-04
Payer: MEDICARE

## 2025-04-01 LAB
25(OH)D3 SERPL-MCNC: 87.5 NG/ML (ref 30–100)
ALBUMIN SERPL-MCNC: 3.3 G/DL (ref 3.4–5)
ALP SERPL-CCNC: 60 U/L (ref 45–117)
ANION GAP SERPL CALC-SCNC: 3 MMOL/L (ref 3–18)
APPEARANCE UR: CLEAR
BACTERIA URNS QL MICRO: ABNORMAL /HPF
BILIRUB UR QL: NEGATIVE
BUN SERPL-MCNC: 25 MG/DL (ref 7–18)
BUN/CREAT SERPL: 22 (ref 12–20)
CALCIUM SERPL-MCNC: 10.1 MG/DL (ref 8.5–10.1)
CALCIUM SERPL-MCNC: 9.8 MG/DL (ref 8.5–10.1)
CHLORIDE SERPL-SCNC: 106 MMOL/L (ref 100–111)
CO2 SERPL-SCNC: 31 MMOL/L (ref 21–32)
COLOR UR: YELLOW
CREAT SERPL-MCNC: 1.13 MG/DL (ref 0.6–1.3)
CREAT UR-MCNC: 53 MG/DL (ref 30–125)
CREAT UR-MCNC: 54 MG/DL (ref 30–125)
EPITH CASTS URNS QL MICRO: ABNORMAL /LPF (ref 0–5)
ERYTHROCYTE [DISTWIDTH] IN BLOOD BY AUTOMATED COUNT: 13.2 % (ref 11.6–14.5)
GLUCOSE SERPL-MCNC: 106 MG/DL (ref 74–99)
GLUCOSE UR STRIP.AUTO-MCNC: >1000 MG/DL
HCT VFR BLD AUTO: 45.7 % (ref 35–45)
HGB BLD-MCNC: 15 G/DL (ref 12–16)
HGB UR QL STRIP: ABNORMAL
KETONES UR QL STRIP.AUTO: NEGATIVE MG/DL
LEUKOCYTE ESTERASE UR QL STRIP.AUTO: ABNORMAL
MAGNESIUM SERPL-MCNC: 2.2 MG/DL (ref 1.6–2.6)
MCH RBC QN AUTO: 29.2 PG (ref 24–34)
MCHC RBC AUTO-ENTMCNC: 32.8 G/DL (ref 31–37)
MCV RBC AUTO: 88.9 FL (ref 78–100)
MICROALBUMIN UR-MCNC: 10.1 MG/DL (ref 0–3)
MICROALBUMIN/CREAT UR-RTO: 191 MG/G (ref 0–30)
NITRITE UR QL STRIP.AUTO: POSITIVE
NRBC # BLD: 0 K/UL (ref 0–0.01)
NRBC BLD-RTO: 0 PER 100 WBC
PH UR STRIP: 5.5 (ref 5–8)
PHOSPHATE SERPL-MCNC: 3.5 MG/DL (ref 2.5–4.9)
PLATELET # BLD AUTO: 231 K/UL (ref 135–420)
PMV BLD AUTO: 10.3 FL (ref 9.2–11.8)
POTASSIUM SERPL-SCNC: 4.5 MMOL/L (ref 3.5–5.5)
PROT UR STRIP-MCNC: ABNORMAL MG/DL
PROT UR-MCNC: 22 MG/DL
PTH-INTACT SERPL-MCNC: 44 PG/ML (ref 18.4–88)
RBC # BLD AUTO: 5.14 M/UL (ref 4.2–5.3)
RBC #/AREA URNS HPF: ABNORMAL /HPF (ref 0–5)
SODIUM SERPL-SCNC: 140 MMOL/L (ref 136–145)
SP GR UR REFRACTOMETRY: 1.02 (ref 1–1.03)
UROBILINOGEN UR QL STRIP.AUTO: 0.2 EU/DL (ref 0.2–1)
WBC # BLD AUTO: 6.2 K/UL (ref 4.6–13.2)
WBC URNS QL MICRO: ABNORMAL /HPF (ref 0–4)

## 2025-04-01 PROCEDURE — 85027 COMPLETE CBC AUTOMATED: CPT

## 2025-04-01 PROCEDURE — 36415 COLL VENOUS BLD VENIPUNCTURE: CPT

## 2025-04-01 PROCEDURE — 84075 ASSAY ALKALINE PHOSPHATASE: CPT

## 2025-04-01 PROCEDURE — 80069 RENAL FUNCTION PANEL: CPT

## 2025-04-01 PROCEDURE — 81001 URINALYSIS AUTO W/SCOPE: CPT

## 2025-04-01 PROCEDURE — 84156 ASSAY OF PROTEIN URINE: CPT

## 2025-04-01 PROCEDURE — 83970 ASSAY OF PARATHORMONE: CPT

## 2025-04-01 PROCEDURE — 82306 VITAMIN D 25 HYDROXY: CPT

## 2025-04-01 PROCEDURE — 83735 ASSAY OF MAGNESIUM: CPT

## 2025-04-01 PROCEDURE — 82570 ASSAY OF URINE CREATININE: CPT

## 2025-04-01 PROCEDURE — 82043 UR ALBUMIN QUANTITATIVE: CPT

## 2025-05-01 ENCOUNTER — TRANSCRIBE ORDERS (OUTPATIENT)
Facility: HOSPITAL | Age: 75
End: 2025-05-01

## 2025-05-01 DIAGNOSIS — R42 DIZZINESS: Primary | ICD-10-CM

## 2025-05-09 ENCOUNTER — HOSPITAL ENCOUNTER (OUTPATIENT)
Age: 75
Discharge: HOME | End: 2025-05-12
Payer: MEDICARE

## 2025-05-09 DIAGNOSIS — R42 DIZZINESS: ICD-10-CM

## 2025-05-09 PROCEDURE — 70450 CT HEAD/BRAIN W/O DYE: CPT

## 2025-06-09 ENCOUNTER — HOSPITAL ENCOUNTER (OUTPATIENT)
Age: 75
Discharge: HOME | End: 2025-06-09
Payer: MEDICARE

## 2025-06-09 LAB
25(OH)D3 SERPL-MCNC: 89 NG/ML (ref 30–100)
ALBUMIN SERPL-MCNC: 3.5 G/DL (ref 3.4–5)
ALP SERPL-CCNC: 59 U/L (ref 45–117)
ANION GAP SERPL CALC-SCNC: 10 MMOL/L (ref 3–18)
APPEARANCE UR: ABNORMAL
BACTERIA URNS QL MICRO: ABNORMAL /HPF
BILIRUB UR QL: NEGATIVE
BUN SERPL-MCNC: 25 MG/DL (ref 6–23)
BUN/CREAT SERPL: 23 (ref 12–20)
CALCIUM SERPL-MCNC: 10.1 MG/DL (ref 8.5–10.1)
CALCIUM SERPL-MCNC: 10.1 MG/DL (ref 8.5–10.1)
CHLORIDE SERPL-SCNC: 99 MMOL/L (ref 98–107)
CO2 SERPL-SCNC: 32 MMOL/L (ref 21–32)
COLOR UR: YELLOW
CREAT SERPL-MCNC: 1.11 MG/DL (ref 0.6–1.3)
CREAT UR-MCNC: 23.3 MG/DL (ref 30–125)
EPITH CASTS URNS QL MICRO: ABNORMAL /LPF (ref 0–5)
ERYTHROCYTE [DISTWIDTH] IN BLOOD BY AUTOMATED COUNT: 13.2 % (ref 11.6–14.5)
GLUCOSE SERPL-MCNC: 106 MG/DL (ref 74–108)
GLUCOSE UR STRIP.AUTO-MCNC: 500 MG/DL
HCT VFR BLD AUTO: 48.8 % (ref 35–45)
HGB BLD-MCNC: 16.1 G/DL (ref 12–16)
HGB UR QL STRIP: NEGATIVE
KETONES UR QL STRIP.AUTO: NEGATIVE MG/DL
LEUKOCYTE ESTERASE UR QL STRIP.AUTO: ABNORMAL
MAGNESIUM SERPL-MCNC: 2.2 MG/DL (ref 1.6–2.6)
MCH RBC QN AUTO: 29.4 PG (ref 24–34)
MCHC RBC AUTO-ENTMCNC: 33 G/DL (ref 31–37)
MCV RBC AUTO: 89.1 FL (ref 78–100)
NITRITE UR QL STRIP.AUTO: POSITIVE
NRBC # BLD: 0 K/UL (ref 0–0.01)
NRBC BLD-RTO: 0 PER 100 WBC
PH UR STRIP: 6 (ref 5–8)
PHOSPHATE SERPL-MCNC: 3.7 MG/DL (ref 2.5–4.9)
PLATELET # BLD AUTO: 217 K/UL (ref 135–420)
PMV BLD AUTO: 10.6 FL (ref 9.2–11.8)
POTASSIUM SERPL-SCNC: 4.5 MMOL/L (ref 3.5–5.5)
PROT UR STRIP-MCNC: NEGATIVE MG/DL
PROT UR-MCNC: 13 MG/DL (ref 0–12)
PTH-INTACT SERPL-MCNC: 38.5 PG/ML (ref 15–65)
RBC # BLD AUTO: 5.48 M/UL (ref 4.2–5.3)
RBC #/AREA URNS HPF: ABNORMAL /HPF (ref 0–5)
SODIUM SERPL-SCNC: 142 MMOL/L (ref 136–145)
SP GR UR REFRACTOMETRY: 1.01 (ref 1–1.03)
URATE SERPL-MCNC: 6.6 MG/DL (ref 2.6–7.2)
UROBILINOGEN UR QL STRIP.AUTO: 0.2 EU/DL (ref 0.2–1)
WBC # BLD AUTO: 5.8 K/UL (ref 4.6–13.2)
WBC URNS QL MICRO: ABNORMAL /HPF (ref 0–4)

## 2025-06-09 PROCEDURE — 83735 ASSAY OF MAGNESIUM: CPT

## 2025-06-09 PROCEDURE — 83970 ASSAY OF PARATHORMONE: CPT

## 2025-06-09 PROCEDURE — 84075 ASSAY ALKALINE PHOSPHATASE: CPT

## 2025-06-09 PROCEDURE — 81001 URINALYSIS AUTO W/SCOPE: CPT

## 2025-06-09 PROCEDURE — 85027 COMPLETE CBC AUTOMATED: CPT

## 2025-06-09 PROCEDURE — 84550 ASSAY OF BLOOD/URIC ACID: CPT

## 2025-06-09 PROCEDURE — 84156 ASSAY OF PROTEIN URINE: CPT

## 2025-06-09 PROCEDURE — 82306 VITAMIN D 25 HYDROXY: CPT

## 2025-06-09 PROCEDURE — 36415 COLL VENOUS BLD VENIPUNCTURE: CPT

## 2025-06-09 PROCEDURE — 80069 RENAL FUNCTION PANEL: CPT

## 2025-06-09 PROCEDURE — 82570 ASSAY OF URINE CREATININE: CPT

## 2025-06-10 LAB
CREAT UR-MCNC: 23.4 MG/DL (ref 30–125)
PROT UR-MCNC: 13 MG/DL (ref 0–12)
PROT/CREAT UR-RTO: 0.5